# Patient Record
Sex: FEMALE | Race: WHITE | Employment: FULL TIME | URBAN - METROPOLITAN AREA
[De-identification: names, ages, dates, MRNs, and addresses within clinical notes are randomized per-mention and may not be internally consistent; named-entity substitution may affect disease eponyms.]

---

## 2017-03-06 ENCOUNTER — TELEPHONE (OUTPATIENT)
Dept: INTERNAL MEDICINE CLINIC | Age: 57
End: 2017-03-06

## 2017-03-06 DIAGNOSIS — Z20.828 EXPOSURE TO THE FLU: Primary | ICD-10-CM

## 2017-03-06 RX ORDER — OSELTAMIVIR PHOSPHATE 75 MG/1
75 CAPSULE ORAL DAILY
Qty: 10 CAPSULE | Refills: 0 | Status: SHIPPED | OUTPATIENT
Start: 2017-03-06 | End: 2017-03-16

## 2017-11-15 ENCOUNTER — HOSPITAL ENCOUNTER (OUTPATIENT)
Dept: MAMMOGRAPHY | Age: 57
Discharge: HOME | End: 2017-11-15
Attending: INTERNAL MEDICINE | Admitting: INTERNAL MEDICINE

## 2017-11-15 DIAGNOSIS — Z12.31 VISIT FOR SCREENING MAMMOGRAM: ICD-10-CM

## 2019-01-10 ENCOUNTER — OFFICE VISIT (OUTPATIENT)
Dept: INTERNAL MEDICINE CLINIC | Age: 59
End: 2019-01-10
Payer: COMMERCIAL

## 2019-01-10 ENCOUNTER — TELEPHONE (OUTPATIENT)
Dept: INTERNAL MEDICINE CLINIC | Age: 59
End: 2019-01-10

## 2019-01-10 ENCOUNTER — HOSPITAL ENCOUNTER (OUTPATIENT)
Dept: VASCULAR LAB | Age: 59
Discharge: HOME | End: 2019-01-10
Payer: COMMERCIAL

## 2019-01-10 VITALS
SYSTOLIC BLOOD PRESSURE: 160 MMHG | RESPIRATION RATE: 12 BRPM | DIASTOLIC BLOOD PRESSURE: 100 MMHG | WEIGHT: 203 LBS | BODY MASS INDEX: 32.77 KG/M2 | HEART RATE: 110 BPM

## 2019-01-10 DIAGNOSIS — I83.893 VARICOSE VEINS OF BOTH LOWER EXTREMITIES WITH COMPLICATIONS: ICD-10-CM

## 2019-01-10 DIAGNOSIS — I87.303 CHRONIC VENOUS HYPERTENSION INVOLVING BOTH SIDES: ICD-10-CM

## 2019-01-10 DIAGNOSIS — I10 WHITE COAT SYNDROME WITH DIAGNOSIS OF HYPERTENSION: ICD-10-CM

## 2019-01-10 DIAGNOSIS — I51.89 DIASTOLIC DYSFUNCTION: ICD-10-CM

## 2019-01-10 DIAGNOSIS — I80.01 THROMBOPHLEBITIS OF SUPERFICIAL VEINS OF RIGHT LOWER EXTREMITY: Primary | ICD-10-CM

## 2019-01-10 DIAGNOSIS — R00.0 TACHYCARDIA: ICD-10-CM

## 2019-01-10 DIAGNOSIS — I80.01 THROMBOPHLEBITIS OF SUPERFICIAL VEINS OF RIGHT LOWER EXTREMITY: ICD-10-CM

## 2019-01-10 PROBLEM — I82.811 ACUTE SUPERFICIAL VENOUS THROMBOSIS OF RIGHT LOWER EXTREMITY: Status: ACTIVE | Noted: 2019-01-10

## 2019-01-10 PROCEDURE — 93970 EXTREMITY STUDY: CPT

## 2019-01-10 PROCEDURE — 99213 OFFICE O/P EST LOW 20 MIN: CPT | Performed by: INTERNAL MEDICINE

## 2019-01-10 RX ORDER — METOPROLOL SUCCINATE 25 MG/1
25 TABLET, EXTENDED RELEASE ORAL DAILY
Qty: 30 TABLET | Refills: 3 | Status: SHIPPED | OUTPATIENT
Start: 2019-01-10 | End: 2019-05-03 | Stop reason: SDUPTHER

## 2019-01-10 RX ORDER — IBUPROFEN 600 MG/1
600 TABLET ORAL
Qty: 60 TABLET | Refills: 0 | Status: SHIPPED | OUTPATIENT
Start: 2019-01-10 | End: 2019-07-12

## 2019-02-04 ENCOUNTER — OFFICE VISIT (OUTPATIENT)
Dept: INTERNAL MEDICINE CLINIC | Age: 59
End: 2019-02-04
Payer: COMMERCIAL

## 2019-02-04 VITALS
HEIGHT: 66 IN | HEART RATE: 92 BPM | BODY MASS INDEX: 33.11 KG/M2 | DIASTOLIC BLOOD PRESSURE: 90 MMHG | SYSTOLIC BLOOD PRESSURE: 150 MMHG | WEIGHT: 206 LBS | RESPIRATION RATE: 12 BRPM

## 2019-02-04 DIAGNOSIS — Z23 FLU VACCINE NEED: ICD-10-CM

## 2019-02-04 DIAGNOSIS — E78.5 HYPERLIPIDEMIA, UNSPECIFIED HYPERLIPIDEMIA TYPE: ICD-10-CM

## 2019-02-04 DIAGNOSIS — Z00.00 ANNUAL PHYSICAL EXAM: Primary | ICD-10-CM

## 2019-02-04 DIAGNOSIS — Z11.4 SCREENING FOR HIV (HUMAN IMMUNODEFICIENCY VIRUS): ICD-10-CM

## 2019-02-04 DIAGNOSIS — E66.9 OBESITY (BMI 30-39.9): ICD-10-CM

## 2019-02-04 DIAGNOSIS — Z11.59 NEED FOR HEPATITIS C SCREENING TEST: ICD-10-CM

## 2019-02-04 DIAGNOSIS — I83.813 VARICOSE VEINS OF BOTH LOWER EXTREMITIES WITH PAIN: ICD-10-CM

## 2019-02-04 DIAGNOSIS — I10 BENIGN ESSENTIAL HTN: ICD-10-CM

## 2019-02-04 DIAGNOSIS — Z23 NEED FOR SHINGLES VACCINE: ICD-10-CM

## 2019-02-04 DIAGNOSIS — R73.01 IMPAIRED FASTING GLUCOSE: ICD-10-CM

## 2019-02-04 LAB
BACTERIA: ABNORMAL /HPF
BILIRUBIN URINE: NEGATIVE
BLOOD, URINE: NEGATIVE
CLARITY: CLEAR
COLOR: YELLOW
EPITHELIAL CELLS, UA: 5 /HPF (ref 0–5)
GLUCOSE URINE: NEGATIVE MG/DL
HYALINE CASTS: 4 /LPF (ref 0–8)
KETONES, URINE: NEGATIVE MG/DL
LEUKOCYTE ESTERASE, URINE: ABNORMAL
MICROSCOPIC EXAMINATION: YES
NITRITE, URINE: NEGATIVE
PH UA: 5
PROTEIN UA: NEGATIVE MG/DL
RBC UA: 2 /HPF (ref 0–4)
SPECIFIC GRAVITY UA: 1.02
UROBILINOGEN, URINE: 0.2 E.U./DL
WBC UA: 5 /HPF (ref 0–5)

## 2019-02-04 PROCEDURE — 90471 IMMUNIZATION ADMIN: CPT | Performed by: INTERNAL MEDICINE

## 2019-02-04 PROCEDURE — 93000 ELECTROCARDIOGRAM COMPLETE: CPT | Performed by: INTERNAL MEDICINE

## 2019-02-04 PROCEDURE — 99396 PREV VISIT EST AGE 40-64: CPT | Performed by: INTERNAL MEDICINE

## 2019-02-04 PROCEDURE — 90686 IIV4 VACC NO PRSV 0.5 ML IM: CPT | Performed by: INTERNAL MEDICINE

## 2019-02-04 RX ORDER — HYDROCHLOROTHIAZIDE 25 MG/1
25 TABLET ORAL DAILY
Qty: 30 TABLET | Refills: 1 | Status: SHIPPED | OUTPATIENT
Start: 2019-02-04 | End: 2019-04-05 | Stop reason: SDUPTHER

## 2019-02-04 ASSESSMENT — PATIENT HEALTH QUESTIONNAIRE - PHQ9
2. FEELING DOWN, DEPRESSED OR HOPELESS: 0
1. LITTLE INTEREST OR PLEASURE IN DOING THINGS: 0
SUM OF ALL RESPONSES TO PHQ9 QUESTIONS 1 & 2: 0
SUM OF ALL RESPONSES TO PHQ QUESTIONS 1-9: 0
SUM OF ALL RESPONSES TO PHQ QUESTIONS 1-9: 0

## 2019-02-26 ENCOUNTER — OFFICE VISIT (OUTPATIENT)
Dept: INTERNAL MEDICINE CLINIC | Age: 59
End: 2019-02-26
Payer: COMMERCIAL

## 2019-02-26 VITALS
BODY MASS INDEX: 32.44 KG/M2 | SYSTOLIC BLOOD PRESSURE: 124 MMHG | WEIGHT: 201 LBS | OXYGEN SATURATION: 100 % | DIASTOLIC BLOOD PRESSURE: 80 MMHG | HEART RATE: 92 BPM

## 2019-02-26 DIAGNOSIS — I10 BENIGN ESSENTIAL HTN: Primary | ICD-10-CM

## 2019-02-26 DIAGNOSIS — R00.0 RACING HEART BEAT: ICD-10-CM

## 2019-02-26 DIAGNOSIS — I87.2 VENOUS INSUFFICIENCY OF BOTH LOWER EXTREMITIES: ICD-10-CM

## 2019-02-26 PROCEDURE — 99213 OFFICE O/P EST LOW 20 MIN: CPT | Performed by: INTERNAL MEDICINE

## 2019-04-05 DIAGNOSIS — I10 BENIGN ESSENTIAL HTN: ICD-10-CM

## 2019-04-05 RX ORDER — HYDROCHLOROTHIAZIDE 25 MG/1
TABLET ORAL
Qty: 30 TABLET | Refills: 3 | Status: SHIPPED | OUTPATIENT
Start: 2019-04-05 | End: 2019-08-07 | Stop reason: SDUPTHER

## 2019-05-03 DIAGNOSIS — I51.89 DIASTOLIC DYSFUNCTION: ICD-10-CM

## 2019-05-03 DIAGNOSIS — R00.0 TACHYCARDIA: ICD-10-CM

## 2019-05-03 DIAGNOSIS — I10 WHITE COAT SYNDROME WITH DIAGNOSIS OF HYPERTENSION: ICD-10-CM

## 2019-05-03 RX ORDER — METOPROLOL SUCCINATE 25 MG/1
TABLET, EXTENDED RELEASE ORAL
Qty: 30 TABLET | Refills: 2 | Status: SHIPPED | OUTPATIENT
Start: 2019-05-03 | End: 2019-07-22 | Stop reason: DRUGHIGH

## 2019-06-18 ENCOUNTER — HOSPITAL ENCOUNTER (OUTPATIENT)
Dept: MAMMOGRAPHY | Age: 59
Discharge: HOME | End: 2019-06-18
Payer: COMMERCIAL

## 2019-06-18 DIAGNOSIS — Z12.31 VISIT FOR SCREENING MAMMOGRAM: ICD-10-CM

## 2019-06-18 PROCEDURE — 77063 BREAST TOMOSYNTHESIS BI: CPT

## 2019-07-12 ENCOUNTER — OFFICE VISIT (OUTPATIENT)
Dept: SURGERY | Age: 59
End: 2019-07-12
Payer: COMMERCIAL

## 2019-07-12 VITALS
OXYGEN SATURATION: 100 % | HEIGHT: 66 IN | HEART RATE: 115 BPM | DIASTOLIC BLOOD PRESSURE: 93 MMHG | TEMPERATURE: 98.1 F | BODY MASS INDEX: 32.78 KG/M2 | SYSTOLIC BLOOD PRESSURE: 145 MMHG | WEIGHT: 204 LBS

## 2019-07-12 DIAGNOSIS — K80.20 GALLSTONES: Primary | ICD-10-CM

## 2019-07-12 PROCEDURE — 99243 OFF/OP CNSLTJ NEW/EST LOW 30: CPT | Performed by: SURGERY

## 2019-07-12 NOTE — PROGRESS NOTES
extended release tablet TAKE ONE TABLET BY MOUTH DAILY 30 tablet 2    hydrochlorothiazide (HYDRODIURIL) 25 MG tablet TAKE ONE TABLET BY MOUTH DAILY 30 tablet 3    aspirin EC 81 MG EC tablet Take 1 tablet by mouth daily. No current facility-administered medications for this visit. Review of Systems:  Review of all other systems was negative except as noted in HPI.     BP (!) 145/93 Comment: recheck after 5 minutes  Pulse 115   Temp 98.1 °F (36.7 °C) (Oral)   Ht 5' 6\" (1.676 m)   Wt 204 lb (92.5 kg)   SpO2 100%   BMI 32.93 kg/m²         Physical Exam:   Constitutional: She is oriented to person, place, and time. She appears well-developed and well-nourished. No distress. HENT: Moist mucus membranes  Head: Normocephalic and atraumatic. Eyes: EOM are normal. Pupils are equal, round, and no icterus. Neck: Normal range of motion. Neck supple. No thyromegaly present. Cardiovascular: Normal rate and regular rhythm by peripheral pulses. Pulmonary/Chest: No respiratory distress. Abdominal: Soft. She exhibits no distension and no mass. There is no hepatosplenomegaly. Discomfort on palpation in RUQ. Has no CVA tenderness. Extremities: She exhibits no edema. Lymphadenopathy: She has no cervical adenopathy. Neurological: Grossly intact. Skin: Skin is warm and dry. Psychiatric:Normal mood and affect. Her behavior is normal. Judgment and thought content normal.        Assessment:     Symptomatic Cholelithiasis. Plan:      1. I discussed the nature of gallbladder disease with the patient. I discussed the operation of laparoscopic cholecystectomy possible open with intraoperative cholagiogram in detail with pictures and printed information given. The complications related to the operation and the anesthesia including bile duct injury explained in detail.  Patient verbalized understanding of the risks, benefits and alternatives of surgery and all questions were answered to the patient's satisfaction. 2. My office will schedule the surgery and will inform the patient about preoperative instructions.     Brett James MD  Surgical Oncologist / General Surgeon

## 2019-07-17 ENCOUNTER — TELEPHONE (OUTPATIENT)
Dept: SURGERY | Age: 59
End: 2019-07-17

## 2019-07-22 ENCOUNTER — OFFICE VISIT (OUTPATIENT)
Dept: INTERNAL MEDICINE CLINIC | Age: 59
End: 2019-07-22
Payer: COMMERCIAL

## 2019-07-22 VITALS
HEIGHT: 66 IN | WEIGHT: 204 LBS | SYSTOLIC BLOOD PRESSURE: 142 MMHG | HEART RATE: 86 BPM | RESPIRATION RATE: 12 BRPM | DIASTOLIC BLOOD PRESSURE: 90 MMHG | OXYGEN SATURATION: 99 % | BODY MASS INDEX: 32.78 KG/M2

## 2019-07-22 DIAGNOSIS — I10 WHITE COAT SYNDROME WITH DIAGNOSIS OF HYPERTENSION: ICD-10-CM

## 2019-07-22 DIAGNOSIS — I83.813 VARICOSE VEINS OF BOTH LOWER EXTREMITIES WITH PAIN: ICD-10-CM

## 2019-07-22 DIAGNOSIS — R73.01 IMPAIRED FASTING GLUCOSE: ICD-10-CM

## 2019-07-22 DIAGNOSIS — I87.2 VENOUS INSUFFICIENCY OF BOTH LOWER EXTREMITIES: ICD-10-CM

## 2019-07-22 DIAGNOSIS — E78.5 HYPERLIPIDEMIA, UNSPECIFIED HYPERLIPIDEMIA TYPE: ICD-10-CM

## 2019-07-22 DIAGNOSIS — K80.20 CALCULUS OF GALLBLADDER WITHOUT CHOLECYSTITIS WITHOUT OBSTRUCTION: ICD-10-CM

## 2019-07-22 DIAGNOSIS — Z01.818 PREOP EXAM FOR INTERNAL MEDICINE: Primary | ICD-10-CM

## 2019-07-22 PROCEDURE — 93000 ELECTROCARDIOGRAM COMPLETE: CPT | Performed by: INTERNAL MEDICINE

## 2019-07-22 PROCEDURE — 99243 OFF/OP CNSLTJ NEW/EST LOW 30: CPT | Performed by: INTERNAL MEDICINE

## 2019-07-22 RX ORDER — METOPROLOL SUCCINATE 50 MG/1
50 TABLET, EXTENDED RELEASE ORAL DAILY
Qty: 30 TABLET | Refills: 3 | Status: SHIPPED | OUTPATIENT
Start: 2019-07-22 | End: 2019-12-04 | Stop reason: SDUPTHER

## 2019-07-22 NOTE — PROGRESS NOTES
[facility]) Physicians  Internal Medicine  Patient Encounter  Amy Solares D.O., Samira Gonzalez          Chief Complaint   Patient presents with    Pre-op Exam       HPI-- 64 y.o. female presents today for a preoperative physical.  Pt diagnosed with symptomatically lithiasis. Pt states she was having episodes of RUQ discomfort after eating. She may have had 10 bouts of the pain always after eating. This past Saturday, she had a bad episode after eating. These episodes are becoming more frequent. She has had some nausea and clamminess associated with these spells. The pain radiates to the shoulder blade region. Her sister suggested she have her GB checked. She had presented to her gynecologist who performed an ultrasound (was getting a pelvic US at the same time) revealing multiple gallstones. Patient is now scheduled for a robotic laparoscopic cholecystectomy with intraoperative cholangiogram.  I have been asked to see patient for pre-operative risk assessment and clearance. Surgery scheduled for 7/26/2019 by Dr. Ari Jordan. Hypertension--Patient remains on Toprol-XL 25 mill grams daily and hydrochlorthiazide 25 mg daily. Her medication compliance is excellent. She denies any symptoms suggestive of accelerated hypertension. She specifically denies any headaches, dizziness, lightheadedness, swelling, visual disturbances, or episodes of unilateral weakness or paresthesias. She denies any other symptoms related to stroke or TIA. She has a reported history of TIA back in 2011. Carotid atherosclerosis--her last carotid Doppler showed less than 50% narrowing of both internal carotid arteries. She did have greater than 50% stenosis of the right external carotid artery. Patient denies any visual disturbances or any other stroke related symptoms. Impaired fasting glucose--Her last fasting glucose was 107.   She endorses no symptoms suggestive of glucose toxicity such as excessive thirst or frequent

## 2019-07-23 ENCOUNTER — HOSPITAL ENCOUNTER (OUTPATIENT)
Age: 59
Discharge: HOME | End: 2019-07-23
Payer: COMMERCIAL

## 2019-07-23 ENCOUNTER — HOSPITAL ENCOUNTER (OUTPATIENT)
Dept: GENERAL RADIOLOGY | Age: 59
Discharge: HOME | End: 2019-07-23
Payer: COMMERCIAL

## 2019-07-23 DIAGNOSIS — Z01.811 PRE-OP CHEST EXAM: ICD-10-CM

## 2019-07-23 PROCEDURE — 71046 X-RAY EXAM CHEST 2 VIEWS: CPT

## 2019-07-25 ENCOUNTER — ANESTHESIA EVENT (OUTPATIENT)
Dept: OPERATING ROOM | Age: 59
End: 2019-07-25
Payer: COMMERCIAL

## 2019-07-25 NOTE — PROGRESS NOTES
bag with personal items. Please have any large items you may need brought in by your family after your arrival to your hospital room. 15. If you have a Living Will or Durable Power of , please bring a copy on the day of your procedure. 15. With your permission, one family member may accompany you while you are being prepared for surgery. Once you are ready, additional family members may join you. HOW WE KEEP YOU SAFE and WORK TO PREVENT SURGICAL SITE INFECTIONS:  1. Health care workers should always check your ID bracelet to verify your name and birth date. You will be asked many times to state your name, date of birth, and allergies. 2. Health care workers should always clean their hands with soap or alcohol gel before providing care to you. It is okay to ask anyone if they cleaned their hands before they touch you. 3. You will be actively involved in verifying the type of procedure you are having and ensuring the correct surgical site. This will be confirmed multiple times prior to your procedure. Do NOT cele your surgery site UNLESS instructed to by your surgeon. 4. Do not shave or wax for 72 hours prior to procedure near your operative site. Shaving with a razor can irritate your skin and make it easier to develop an infection. On the day of your procedure, any hair that needs to be removed near the surgical site will be clipped by a healthcare worker using a special clippers designed to avoid skin irritation. 5. When you are in the operating room, your surgical site will be cleansed with a special soap, and in most cases, you will be given an antibiotic before the surgery begins. What to expect AFTER YOUR PROCEDURE:  1. Immediately following your procedure, your will be taken to the PACU for the first phase of your recovery.   Your nurse will help you recover from any potential side effects of anesthesia, such as extreme drowsiness, changes in your vital signs or breathing patterns. Nausea, headache, muscle aches, or sore throat may also occur after anesthesia. Your nurse will help you manage these potential side effects. 2. For comfort and safety, arrange to have someone at home with you for the first 24 hours after discharge. 3. You and your family will be given written instructions about your diet, activity, dressing care, medications, and return visits. 4. Once at home, should issues with nausea, pain, or bleeding occur, or should you notice any signs of infection, you should call your surgeon. 5. Always clean your hands before and after caring for your wound. Do not let your family touch your surgery site without cleaning their hands. 6. Narcotic pain medications can cause significant constipation. You may want to add a stool softener to your postoperative medication schedule or speak to your surgeon on how best to manage this SIDE EFFECT. SPECIAL INSTRUCTIONS      Thank you for allowing us to care for you. We strive to exceed your expectations in the delivery of care and service provided to you and your family. If you need to contact us for any reason, please call us at [phone]    Instructions reviewed with patient during preadmission testing phone interview. Silvia Weiner. 7/25/2019 .11:28 AM      ADDITIONAL EDUCATIONAL INFORMATION REVIEWED PER PHONE WITH YOU AND/OR YOUR FAMILY:  No Bring a urine sample on day of surgery  Yes Pain Goal-Taking Control of Your Pain  Yes FAQs about Surgical Site Infections  Yes Hibiclens® Bathing Instructions   Yes Antibacterial Soap  No Toni® Wipes Bathing Instructions (Obtained from: https://www.[employer]/. pdf )  No Incentive Spirometer Education  No Other

## 2019-07-26 ENCOUNTER — HOSPITAL ENCOUNTER (OUTPATIENT)
Age: 59
Setting detail: OUTPATIENT SURGERY
Discharge: HOME | End: 2019-07-26
Attending: SURGERY | Admitting: SURGERY
Payer: COMMERCIAL

## 2019-07-26 ENCOUNTER — ANESTHESIA (OUTPATIENT)
Dept: OPERATING ROOM | Age: 59
End: 2019-07-26
Payer: COMMERCIAL

## 2019-07-26 VITALS
SYSTOLIC BLOOD PRESSURE: 104 MMHG | HEIGHT: 66 IN | OXYGEN SATURATION: 96 % | WEIGHT: 204 LBS | TEMPERATURE: 97.3 F | BODY MASS INDEX: 32.78 KG/M2 | RESPIRATION RATE: 14 BRPM | DIASTOLIC BLOOD PRESSURE: 69 MMHG | HEART RATE: 74 BPM

## 2019-07-26 VITALS — DIASTOLIC BLOOD PRESSURE: 105 MMHG | SYSTOLIC BLOOD PRESSURE: 172 MMHG | OXYGEN SATURATION: 93 %

## 2019-07-26 DIAGNOSIS — K80.20 SYMPTOMATIC CHOLELITHIASIS: Primary | ICD-10-CM

## 2019-07-26 PROCEDURE — 2500000003 HC RX 250 WO HCPCS: Performed by: NURSE ANESTHETIST, CERTIFIED REGISTERED

## 2019-07-26 PROCEDURE — 3600000019 HC SURGERY ROBOT ADDTL 15MIN: Performed by: SURGERY

## 2019-07-26 PROCEDURE — 7100000010 HC PHASE II RECOVERY - FIRST 15 MIN: Performed by: SURGERY

## 2019-07-26 PROCEDURE — 2580000003 HC RX 258: Performed by: ANESTHESIOLOGY

## 2019-07-26 PROCEDURE — 47563 LAPARO CHOLECYSTECTOMY/GRAPH: CPT | Performed by: SURGERY

## 2019-07-26 PROCEDURE — 2720000010 HC SURG SUPPLY STERILE: Performed by: SURGERY

## 2019-07-26 PROCEDURE — S2900 ROBOTIC SURGICAL SYSTEM: HCPCS | Performed by: SURGERY

## 2019-07-26 PROCEDURE — 2500000003 HC RX 250 WO HCPCS: Performed by: SURGERY

## 2019-07-26 PROCEDURE — 6360000002 HC RX W HCPCS: Performed by: ANESTHESIOLOGY

## 2019-07-26 PROCEDURE — 6360000002 HC RX W HCPCS: Performed by: SURGERY

## 2019-07-26 PROCEDURE — 6370000000 HC RX 637 (ALT 250 FOR IP)

## 2019-07-26 PROCEDURE — 2580000003 HC RX 258: Performed by: SURGERY

## 2019-07-26 PROCEDURE — 3600000009 HC SURGERY ROBOT BASE: Performed by: SURGERY

## 2019-07-26 PROCEDURE — 3700000000 HC ANESTHESIA ATTENDED CARE: Performed by: SURGERY

## 2019-07-26 PROCEDURE — 7100000011 HC PHASE II RECOVERY - ADDTL 15 MIN: Performed by: SURGERY

## 2019-07-26 PROCEDURE — 6360000002 HC RX W HCPCS: Performed by: NURSE ANESTHETIST, CERTIFIED REGISTERED

## 2019-07-26 PROCEDURE — 7100000001 HC PACU RECOVERY - ADDTL 15 MIN: Performed by: SURGERY

## 2019-07-26 PROCEDURE — 3700000001 HC ADD 15 MINUTES (ANESTHESIA): Performed by: SURGERY

## 2019-07-26 PROCEDURE — 2709999900 HC NON-CHARGEABLE SUPPLY: Performed by: SURGERY

## 2019-07-26 PROCEDURE — 7100000000 HC PACU RECOVERY - FIRST 15 MIN: Performed by: SURGERY

## 2019-07-26 PROCEDURE — 88304 TISSUE EXAM BY PATHOLOGIST: CPT

## 2019-07-26 RX ORDER — NEOSTIGMINE METHYLSULFATE 5 MG/5 ML
SYRINGE (ML) INTRAVENOUS PRN
Status: DISCONTINUED | OUTPATIENT
Start: 2019-07-26 | End: 2019-07-26 | Stop reason: SDUPTHER

## 2019-07-26 RX ORDER — SODIUM CHLORIDE, SODIUM LACTATE, POTASSIUM CHLORIDE, CALCIUM CHLORIDE 600; 310; 30; 20 MG/100ML; MG/100ML; MG/100ML; MG/100ML
INJECTION, SOLUTION INTRAVENOUS CONTINUOUS
Status: DISCONTINUED | OUTPATIENT
Start: 2019-07-26 | End: 2019-07-26 | Stop reason: HOSPADM

## 2019-07-26 RX ORDER — INDOCYANINE GREEN AND WATER 25 MG
KIT INJECTION PRN
Status: DISCONTINUED | OUTPATIENT
Start: 2019-07-26 | End: 2019-07-26 | Stop reason: SDUPTHER

## 2019-07-26 RX ORDER — DEXAMETHASONE SODIUM PHOSPHATE 4 MG/ML
INJECTION, SOLUTION INTRA-ARTICULAR; INTRALESIONAL; INTRAMUSCULAR; INTRAVENOUS; SOFT TISSUE PRN
Status: DISCONTINUED | OUTPATIENT
Start: 2019-07-26 | End: 2019-07-26 | Stop reason: SDUPTHER

## 2019-07-26 RX ORDER — PROPOFOL 10 MG/ML
INJECTION, EMULSION INTRAVENOUS PRN
Status: DISCONTINUED | OUTPATIENT
Start: 2019-07-26 | End: 2019-07-26 | Stop reason: SDUPTHER

## 2019-07-26 RX ORDER — LIDOCAINE HYDROCHLORIDE 20 MG/ML
INJECTION, SOLUTION INTRAVENOUS PRN
Status: DISCONTINUED | OUTPATIENT
Start: 2019-07-26 | End: 2019-07-26 | Stop reason: SDUPTHER

## 2019-07-26 RX ORDER — MAGNESIUM HYDROXIDE 1200 MG/15ML
LIQUID ORAL CONTINUOUS PRN
Status: COMPLETED | OUTPATIENT
Start: 2019-07-26 | End: 2019-07-26

## 2019-07-26 RX ORDER — PROMETHAZINE HYDROCHLORIDE 25 MG/ML
6.25 INJECTION, SOLUTION INTRAMUSCULAR; INTRAVENOUS
Status: COMPLETED | OUTPATIENT
Start: 2019-07-26 | End: 2019-07-26

## 2019-07-26 RX ORDER — FENTANYL CITRATE 50 UG/ML
INJECTION, SOLUTION INTRAMUSCULAR; INTRAVENOUS PRN
Status: DISCONTINUED | OUTPATIENT
Start: 2019-07-26 | End: 2019-07-26 | Stop reason: SDUPTHER

## 2019-07-26 RX ORDER — SODIUM CHLORIDE 0.9 % (FLUSH) 0.9 %
10 SYRINGE (ML) INJECTION PRN
Status: DISCONTINUED | OUTPATIENT
Start: 2019-07-26 | End: 2019-07-26 | Stop reason: HOSPADM

## 2019-07-26 RX ORDER — SODIUM CHLORIDE 0.9 % (FLUSH) 0.9 %
10 SYRINGE (ML) INJECTION EVERY 12 HOURS SCHEDULED
Status: DISCONTINUED | OUTPATIENT
Start: 2019-07-26 | End: 2019-07-26 | Stop reason: HOSPADM

## 2019-07-26 RX ORDER — SODIUM CHLORIDE, SODIUM LACTATE, POTASSIUM CHLORIDE, AND CALCIUM CHLORIDE .6; .31; .03; .02 G/100ML; G/100ML; G/100ML; G/100ML
IRRIGANT IRRIGATION PRN
Status: DISCONTINUED | OUTPATIENT
Start: 2019-07-26 | End: 2019-07-26 | Stop reason: ALTCHOICE

## 2019-07-26 RX ORDER — ONDANSETRON 2 MG/ML
INJECTION INTRAMUSCULAR; INTRAVENOUS PRN
Status: DISCONTINUED | OUTPATIENT
Start: 2019-07-26 | End: 2019-07-26 | Stop reason: SDUPTHER

## 2019-07-26 RX ORDER — ONDANSETRON 2 MG/ML
4 INJECTION INTRAMUSCULAR; INTRAVENOUS
Status: DISCONTINUED | OUTPATIENT
Start: 2019-07-26 | End: 2019-07-26 | Stop reason: HOSPADM

## 2019-07-26 RX ORDER — INDOCYANINE GREEN AND WATER 25 MG
KIT INJECTION PRN
Status: DISCONTINUED | OUTPATIENT
Start: 2019-07-26 | End: 2019-07-26 | Stop reason: ALTCHOICE

## 2019-07-26 RX ORDER — LIDOCAINE HYDROCHLORIDE 10 MG/ML
1 INJECTION, SOLUTION EPIDURAL; INFILTRATION; INTRACAUDAL; PERINEURAL
Status: DISCONTINUED | OUTPATIENT
Start: 2019-07-26 | End: 2019-07-26 | Stop reason: HOSPADM

## 2019-07-26 RX ORDER — BUPIVACAINE HYDROCHLORIDE AND EPINEPHRINE 5; 5 MG/ML; UG/ML
INJECTION, SOLUTION EPIDURAL; INTRACAUDAL; PERINEURAL PRN
Status: DISCONTINUED | OUTPATIENT
Start: 2019-07-26 | End: 2019-07-26 | Stop reason: ALTCHOICE

## 2019-07-26 RX ORDER — ROCURONIUM BROMIDE 10 MG/ML
INJECTION, SOLUTION INTRAVENOUS PRN
Status: DISCONTINUED | OUTPATIENT
Start: 2019-07-26 | End: 2019-07-26 | Stop reason: SDUPTHER

## 2019-07-26 RX ORDER — FENTANYL CITRATE 50 UG/ML
25 INJECTION, SOLUTION INTRAMUSCULAR; INTRAVENOUS EVERY 5 MIN PRN
Status: DISCONTINUED | OUTPATIENT
Start: 2019-07-26 | End: 2019-07-26 | Stop reason: HOSPADM

## 2019-07-26 RX ORDER — GLYCOPYRROLATE 1 MG/5 ML
SYRINGE (ML) INTRAVENOUS PRN
Status: DISCONTINUED | OUTPATIENT
Start: 2019-07-26 | End: 2019-07-26 | Stop reason: SDUPTHER

## 2019-07-26 RX ORDER — SUCCINYLCHOLINE/SOD CL,ISO/PF 200MG/10ML
SYRINGE (ML) INTRAVENOUS PRN
Status: DISCONTINUED | OUTPATIENT
Start: 2019-07-26 | End: 2019-07-26 | Stop reason: SDUPTHER

## 2019-07-26 RX ORDER — OXYCODONE HYDROCHLORIDE AND ACETAMINOPHEN 5; 325 MG/1; MG/1
1 TABLET ORAL EVERY 6 HOURS PRN
Qty: 20 TABLET | Refills: 0 | Status: SHIPPED | OUTPATIENT
Start: 2019-07-26 | End: 2019-07-31

## 2019-07-26 RX ORDER — FENTANYL CITRATE 50 UG/ML
50 INJECTION, SOLUTION INTRAMUSCULAR; INTRAVENOUS EVERY 5 MIN PRN
Status: DISCONTINUED | OUTPATIENT
Start: 2019-07-26 | End: 2019-07-26 | Stop reason: HOSPADM

## 2019-07-26 RX ADMIN — FENTANYL CITRATE 50 MCG: 50 INJECTION INTRAMUSCULAR; INTRAVENOUS at 09:51

## 2019-07-26 RX ADMIN — DEXAMETHASONE SODIUM PHOSPHATE 4 MG: 4 INJECTION, SOLUTION INTRAMUSCULAR; INTRAVENOUS at 08:53

## 2019-07-26 RX ADMIN — FENTANYL CITRATE 50 MCG: 50 INJECTION INTRAMUSCULAR; INTRAVENOUS at 09:48

## 2019-07-26 RX ADMIN — ROCURONIUM BROMIDE 35 MG: 10 INJECTION, SOLUTION INTRAVENOUS at 08:16

## 2019-07-26 RX ADMIN — ROCURONIUM BROMIDE 10 MG: 10 INJECTION, SOLUTION INTRAVENOUS at 08:47

## 2019-07-26 RX ADMIN — Medication 0.8 MG: at 09:44

## 2019-07-26 RX ADMIN — PROPOFOL 200 MG: 10 INJECTION, EMULSION INTRAVENOUS at 08:10

## 2019-07-26 RX ADMIN — PROMETHAZINE HYDROCHLORIDE 6.25 MG: 25 INJECTION INTRAMUSCULAR; INTRAVENOUS at 10:37

## 2019-07-26 RX ADMIN — SODIUM CHLORIDE, SODIUM LACTATE, POTASSIUM CHLORIDE, AND CALCIUM CHLORIDE: 600; 310; 30; 20 INJECTION, SOLUTION INTRAVENOUS at 09:49

## 2019-07-26 RX ADMIN — HYDROMORPHONE HYDROCHLORIDE 0.5 MG: 1 INJECTION, SOLUTION INTRAMUSCULAR; INTRAVENOUS; SUBCUTANEOUS at 10:25

## 2019-07-26 RX ADMIN — FENTANYL CITRATE 100 MCG: 50 INJECTION INTRAMUSCULAR; INTRAVENOUS at 08:10

## 2019-07-26 RX ADMIN — Medication 2 G: at 08:20

## 2019-07-26 RX ADMIN — HYDROMORPHONE HYDROCHLORIDE 0.5 MG: 1 INJECTION, SOLUTION INTRAMUSCULAR; INTRAVENOUS; SUBCUTANEOUS at 10:13

## 2019-07-26 RX ADMIN — ROCURONIUM BROMIDE 5 MG: 10 INJECTION, SOLUTION INTRAVENOUS at 08:10

## 2019-07-26 RX ADMIN — HYDROMORPHONE HYDROCHLORIDE 0.5 MG: 1 INJECTION, SOLUTION INTRAMUSCULAR; INTRAVENOUS; SUBCUTANEOUS at 11:46

## 2019-07-26 RX ADMIN — Medication 4 MG: at 09:44

## 2019-07-26 RX ADMIN — INDOCYANINE GREEN AND WATER 7.5 MG: KIT at 08:21

## 2019-07-26 RX ADMIN — SODIUM CHLORIDE, SODIUM LACTATE, POTASSIUM CHLORIDE, AND CALCIUM CHLORIDE: 600; 310; 30; 20 INJECTION, SOLUTION INTRAVENOUS at 08:04

## 2019-07-26 RX ADMIN — SODIUM CHLORIDE, SODIUM LACTATE, POTASSIUM CHLORIDE, AND CALCIUM CHLORIDE: 600; 310; 30; 20 INJECTION, SOLUTION INTRAVENOUS at 07:25

## 2019-07-26 RX ADMIN — LIDOCAINE HYDROCHLORIDE 50 MG: 20 INJECTION, SOLUTION INTRAVENOUS at 08:10

## 2019-07-26 RX ADMIN — Medication 100 MG: at 08:10

## 2019-07-26 RX ADMIN — SODIUM CHLORIDE, SODIUM LACTATE, POTASSIUM CHLORIDE, AND CALCIUM CHLORIDE: 600; 310; 30; 20 INJECTION, SOLUTION INTRAVENOUS at 07:24

## 2019-07-26 RX ADMIN — FENTANYL CITRATE 100 MCG: 50 INJECTION INTRAMUSCULAR; INTRAVENOUS at 08:44

## 2019-07-26 RX ADMIN — ONDANSETRON 4 MG: 2 INJECTION INTRAMUSCULAR; INTRAVENOUS at 09:33

## 2019-07-26 ASSESSMENT — PULMONARY FUNCTION TESTS
PIF_VALUE: 19
PIF_VALUE: 1
PIF_VALUE: 27
PIF_VALUE: 0
PIF_VALUE: 5
PIF_VALUE: 25
PIF_VALUE: 26
PIF_VALUE: 29
PIF_VALUE: 26
PIF_VALUE: 21
PIF_VALUE: 20
PIF_VALUE: 27
PIF_VALUE: 26
PIF_VALUE: 25
PIF_VALUE: 26
PIF_VALUE: 1
PIF_VALUE: 27
PIF_VALUE: 19
PIF_VALUE: 26
PIF_VALUE: 19
PIF_VALUE: 25
PIF_VALUE: 8
PIF_VALUE: 1
PIF_VALUE: 0
PIF_VALUE: 20
PIF_VALUE: 26
PIF_VALUE: 20
PIF_VALUE: 1
PIF_VALUE: 26
PIF_VALUE: 25
PIF_VALUE: 18
PIF_VALUE: 1
PIF_VALUE: 26
PIF_VALUE: 21
PIF_VALUE: 27
PIF_VALUE: 22
PIF_VALUE: 20
PIF_VALUE: 25
PIF_VALUE: 26
PIF_VALUE: 2
PIF_VALUE: 26
PIF_VALUE: 20
PIF_VALUE: 19
PIF_VALUE: 25
PIF_VALUE: 22
PIF_VALUE: 27
PIF_VALUE: 26
PIF_VALUE: 26
PIF_VALUE: 25
PIF_VALUE: 20
PIF_VALUE: 26
PIF_VALUE: 26
PIF_VALUE: 24
PIF_VALUE: 28
PIF_VALUE: 19
PIF_VALUE: 26
PIF_VALUE: 22
PIF_VALUE: 26
PIF_VALUE: 19
PIF_VALUE: 25
PIF_VALUE: 20
PIF_VALUE: 26
PIF_VALUE: 27
PIF_VALUE: 4
PIF_VALUE: 20
PIF_VALUE: 22
PIF_VALUE: 20
PIF_VALUE: 18
PIF_VALUE: 24
PIF_VALUE: 25
PIF_VALUE: 20
PIF_VALUE: 18
PIF_VALUE: 27
PIF_VALUE: 26
PIF_VALUE: 26
PIF_VALUE: 1
PIF_VALUE: 20
PIF_VALUE: 28
PIF_VALUE: 25
PIF_VALUE: 26
PIF_VALUE: 3
PIF_VALUE: 20
PIF_VALUE: 26
PIF_VALUE: 25
PIF_VALUE: 5
PIF_VALUE: 26
PIF_VALUE: 27
PIF_VALUE: 26
PIF_VALUE: 20
PIF_VALUE: 17
PIF_VALUE: 21
PIF_VALUE: 22
PIF_VALUE: 26
PIF_VALUE: 20
PIF_VALUE: 27
PIF_VALUE: 20
PIF_VALUE: 0
PIF_VALUE: 28
PIF_VALUE: 19
PIF_VALUE: 28
PIF_VALUE: 27
PIF_VALUE: 19
PIF_VALUE: 28
PIF_VALUE: 20
PIF_VALUE: 26
PIF_VALUE: 26
PIF_VALUE: 28

## 2019-07-26 ASSESSMENT — PAIN SCALES - GENERAL
PAINLEVEL_OUTOF10: 9
PAINLEVEL_OUTOF10: 7
PAINLEVEL_OUTOF10: 4
PAINLEVEL_OUTOF10: 9
PAINLEVEL_OUTOF10: 8

## 2019-07-26 ASSESSMENT — PAIN DESCRIPTION - LOCATION
LOCATION: ABDOMEN

## 2019-07-26 ASSESSMENT — PAIN DESCRIPTION - PAIN TYPE
TYPE: SURGICAL PAIN

## 2019-07-26 ASSESSMENT — LIFESTYLE VARIABLES: SMOKING_STATUS: 1

## 2019-07-26 ASSESSMENT — PAIN DESCRIPTION - FREQUENCY
FREQUENCY: CONTINUOUS

## 2019-07-26 ASSESSMENT — PAIN - FUNCTIONAL ASSESSMENT: PAIN_FUNCTIONAL_ASSESSMENT: 0-10

## 2019-07-26 NOTE — PROGRESS NOTES
Ambulatory Surgery/Procedure Discharge Note    Vitals:    07/26/19 1403   BP:    Pulse:    Resp:    Temp: 97.3 °F (36.3 °C)   SpO2:        In: 1443 [P.O.:75; I.V.:1368]  Out: 30     Restroom use offered before discharge. Yes    Pain assessment:  present - adequately treated  Pain Level: 4        Patient discharged to home/self care.  Patient discharged via wheel chair by transporter to waiting family/S.O.       7/26/2019 2:15 PM

## 2019-07-26 NOTE — PROGRESS NOTES
CLINICAL PHARMACY NOTE: MEDS TO [address] Select Patient?: No  Total # of Prescriptions Filled: 1   The following medications were delivered to the patient:  · Oxycodone-acetaminophen 5/325mg  Total # of Interventions Completed: 0  Time Spent (min): 60    Additional Documentation:

## 2019-07-26 NOTE — PROGRESS NOTES
PACU Transfer to [facility]    Vitals:    07/26/19 1220   BP:    Pulse: 66   Resp: 12   Temp:    SpO2: 99%         Intake/Output Summary (Last 24 hours) at 7/26/2019 1221  Last data filed at 7/26/2019 1215  Gross per 24 hour   Intake 1368 ml   Output 30 ml   Net 1338 ml       Pain assessment:  Above BP is 95/58 and temperature is 97.3 and pain tolerable at a level 4. Patient meets all patricia discharge criteria for Phase 2.   Pain Level: 4    Patient transferred to care of [facility] RN.    7/26/2019 12:21 PM

## 2019-07-26 NOTE — OP NOTE
Operative note    Ni Arreguin  1960  [id number]    Pre-operative Diagnosis: symptomatic cholelithiasis    Post-operative Diagnosis: same    Procedure: Robotic assisted laparoscopic cholecystectomy, ICG cholangiogram imaging      Indication for procedure: This is a 64 y.o woman who has been having postprandial pain for two months. She had imaging that showed cholelithiasis which was consistent with symptomatic cholelithiasis. Robotic / laparoscopic gall bladder removal was recommended and the patient was agreeable to the aforementioned procedure. All the complications including bile duct injury were explained. Risks, benefits and alternatives of surgery explained to the patient and patient wishes to proceed with surgery. Description of procedure: The patient was taken to the operating theatre and laid in supine position on the operating table. Bilateral lower athrombics were placed. General endotracheal anesthesia was provided per Anesthesiology. Preoperative antibiotics were given and a timeout was called. Once anesthetized, the patient's abdomen was prepped and draped in the usual sterile fashion. We first made a transverse incision and placed a 5 mm trocar via [facility]. The abdomen was insufflated. Three more 8-mm working trocars were placed -- one in the right lateral position just below the level of the umbilicus; one at the level of the umbilicus 8 cm lateral (right); and one just above the umbilicus. We upsized the LUQ port to 8 mm. We then docked the robot in standard fashion. The gallbladder was grasped and retracted cephalad to the right shoulder, over the right lobe of the liver. There were moderate adhesions to the gallbladder fundus, which were taken down with a combination of blunt dissection and electrocautery. The fundus was grasped and retracted laterally. The peritoneum along the medial and lateral sides of the gallbladder was scored.  The peritoneal attachments were taken down in a circumferential manner. The cystic duct and cystic artery were dissected, creating a critical view. Cystic duct and bile duct anatomy was also clarified by ICG firefly imaging (ICG was given at starting of the procedure). Bipolar and a hem-o-lock clip were used to ligate the cystic artery. Heme-o-lock clips were placed to the cystic duct (one on the distal end of the duct and two more proximal on the cystic duct. The cystic duct was ligated with laparoscopic scissors. The gallbladder was removed from the gallbladder fossa with electrocautery. The gallbladder was entered during the removal and there was some bile spillage but no stones. We then undocked the robot and finished the case laparoscopically. The specimen was handed into an EndoCatch bag and passed off the surgical field. Inspection revealed that hemostasis was assured. All clips were in their normal positions and secure. We irrigated the gallbladder fossa and above the liver/perihepatic space copiously with saline. The supraumbilical port was closed with suture passer. The abdomen was desufflated. We irrigated the supraumbilical port site with saline irrigation. The  port sites were reapproximated with 4-0 monocryl in interrupted fashion. The wounds were dried and skin glue was applied. All sponge, instrument, and needle counts were correct x2 at the end of the case. The patient was tolerated the procedure well and was taken to the PACU in stable condition.     Dr. Avtar Parker scrubbed and present for the entire case. Anesthesia: General, Local 30 mL 0.5% marcaine    Surgeons/Assistants: Davida Mackey / Jeannie Anderson MD PGY5    Estimated Blood Loss: <5 mL    Complications: none    Specimens: gallbladder    Findings: non-inflamed gallbladder    Dispo: PACU, then home    Jeannie Anderson MD  07/26/19  9:55 AM    I was present for the entire procedure. Agree with the above note and changes made accordingly.      Nitesh

## 2019-07-26 NOTE — PROGRESS NOTES
Pt and spouse verbalize understanding of dc instructions. Spouse had scripts filled at our outpt pharmacy. Pt incisions are clean dry and intact.

## 2019-08-02 ENCOUNTER — OFFICE VISIT (OUTPATIENT)
Dept: SURGERY | Age: 59
End: 2019-08-02

## 2019-08-02 VITALS
SYSTOLIC BLOOD PRESSURE: 130 MMHG | WEIGHT: 202 LBS | DIASTOLIC BLOOD PRESSURE: 92 MMHG | HEIGHT: 66 IN | TEMPERATURE: 97 F | BODY MASS INDEX: 32.47 KG/M2 | OXYGEN SATURATION: 100 % | HEART RATE: 90 BPM

## 2019-08-02 DIAGNOSIS — Z09 POSTOP CHECK: Primary | ICD-10-CM

## 2019-08-02 PROCEDURE — 99024 POSTOP FOLLOW-UP VISIT: CPT | Performed by: SURGERY

## 2019-08-07 DIAGNOSIS — I10 BENIGN ESSENTIAL HTN: ICD-10-CM

## 2019-08-07 RX ORDER — HYDROCHLOROTHIAZIDE 25 MG/1
TABLET ORAL
Qty: 30 TABLET | Refills: 5 | Status: SHIPPED | OUTPATIENT
Start: 2019-08-07 | End: 2020-01-31

## 2019-08-12 ENCOUNTER — TELEPHONE (OUTPATIENT)
Dept: VASCULAR SURGERY | Age: 59
End: 2019-08-12

## 2019-08-12 DIAGNOSIS — I87.2 VENOUS INSUFFICIENCY OF BOTH LOWER EXTREMITIES: Primary | ICD-10-CM

## 2019-08-16 ENCOUNTER — HOSPITAL ENCOUNTER (OUTPATIENT)
Dept: VASCULAR LAB | Age: 59
Discharge: HOME | End: 2019-08-16
Payer: COMMERCIAL

## 2019-08-16 PROCEDURE — 93970 EXTREMITY STUDY: CPT

## 2019-08-20 ENCOUNTER — OFFICE VISIT (OUTPATIENT)
Dept: VASCULAR SURGERY | Age: 59
End: 2019-08-20
Payer: COMMERCIAL

## 2019-08-20 VITALS
DIASTOLIC BLOOD PRESSURE: 100 MMHG | HEIGHT: 66 IN | RESPIRATION RATE: 16 BRPM | WEIGHT: 204.8 LBS | SYSTOLIC BLOOD PRESSURE: 142 MMHG | OXYGEN SATURATION: 98 % | TEMPERATURE: 97.5 F | HEART RATE: 93 BPM | BODY MASS INDEX: 32.92 KG/M2

## 2019-08-20 DIAGNOSIS — I83.11 VARICOSE VEINS OF RIGHT LOWER EXTREMITY WITH INFLAMMATION: ICD-10-CM

## 2019-08-20 PROCEDURE — 99214 OFFICE O/P EST MOD 30 MIN: CPT | Performed by: SURGERY

## 2019-08-20 NOTE — PROGRESS NOTES
Rupert Goodwin MD at [address] [facility] SURGERY Right 2012    removed scar tissue, stone was gone when they went in to get it.  RETINAL LASER  2015   [address]    x 2 on left     SKIN BIOPSY  7/2015    x2     Home Medications:   Prior to Admission medications    Medication Sig Start Date End Date Taking? Authorizing Provider   hydrochlorothiazide (HYDRODIURIL) 25 MG tablet TAKE ONE TABLET BY MOUTH DAILY 19  Yes Pavel Thompson, DO   metoprolol succinate (TOPROL XL) 50 MG extended release tablet Take 1 tablet by mouth daily 19  Yes Pavel Thompson DO   aspirin EC 81 MG EC tablet Take 1 tablet by mouth daily. 11  Yes Leigh Ann Engle MD      Allergies:  Biaxin [clarithromycin]     Social History:    Social History     Socioeconomic History    Marital status:      Spouse name: Not on file    Number of children: Not on file    Years of education: Not on file    Highest education level: Not on file   Occupational History    Not on file   Social Needs    Financial resource strain: Not on file    Food insecurity:     Worry: Not on file     Inability: Not on file    Transportation needs:     Medical: Not on file     Non-medical: Not on file   Tobacco Use    Smoking status: Former Smoker     Packs/day: 0.50     Years: 35.00     Pack years: 17.50     Types: Cigarettes     Last attempt to quit: 2019     Years since quittin.5    Smokeless tobacco: Former User     Quit date: 2016   Substance and Sexual Activity    Alcohol use:  Yes     Alcohol/week: 0.0 standard drinks     Comment: weekends    Drug use: No    Sexual activity: Yes     Partners: Male   Lifestyle    Physical activity:     Days per week: Not on file     Minutes per session: Not on file    Stress: Not on file   Relationships    Social connections:     Talks on phone: Not on file     Gets together: Not on file     Attends [employer] service: Not

## 2019-09-11 ENCOUNTER — OFFICE VISIT (OUTPATIENT)
Dept: INTERNAL MEDICINE CLINIC | Age: 59
End: 2019-09-11
Payer: COMMERCIAL

## 2019-09-11 VITALS
SYSTOLIC BLOOD PRESSURE: 136 MMHG | RESPIRATION RATE: 12 BRPM | BODY MASS INDEX: 33.57 KG/M2 | WEIGHT: 208 LBS | DIASTOLIC BLOOD PRESSURE: 80 MMHG | HEART RATE: 72 BPM

## 2019-09-11 DIAGNOSIS — M17.0 BILATERAL PRIMARY OSTEOARTHRITIS OF KNEE: Primary | ICD-10-CM

## 2019-09-11 DIAGNOSIS — I10 BENIGN ESSENTIAL HTN: ICD-10-CM

## 2019-09-11 PROCEDURE — 99213 OFFICE O/P EST LOW 20 MIN: CPT | Performed by: INTERNAL MEDICINE

## 2019-09-11 RX ORDER — MELOXICAM 15 MG/1
15 TABLET ORAL
Qty: 30 TABLET | Refills: 0 | Status: SHIPPED | OUTPATIENT
Start: 2019-09-11 | End: 2020-04-06 | Stop reason: ALTCHOICE

## 2019-09-11 NOTE — PATIENT INSTRUCTIONS
making the arthritis worse. · Do not sit for long periods of time. Try to walk once in a while to keep your knee from getting stiff. · Ask your doctor or physical therapist whether shoe inserts may reduce your arthritis pain. · If you can afford it, get new athletic shoes at least every year. This can help reduce the strain on your knees. · Use a device to help you do everyday activities. ? A cane or walking stick can help you keep your balance when you walk. Hold the cane or walking stick in the hand opposite the painful knee. ? If you feel like you may fall when you walk, try using crutches or a front-wheeled walker. These can prevent falls that could cause more damage to your knee. ? A knee brace may help keep your knee stable and prevent pain. ? You also can use other things to make life easier, such as a higher toilet seat and handrails in the bathtub or shower. · Take pain medicines exactly as directed. ? Do not wait until you are in severe pain. You will get better results if you take it sooner. ? If you are not taking a prescription pain medicine, take an over-the-counter medicine such as acetaminophen (Tylenol), ibuprofen (Advil, Motrin), or naproxen (Aleve). Read and follow all instructions on the label. ? Do not take two or more pain medicines at the same time unless the doctor told you to. Many pain medicines have acetaminophen, which is Tylenol. Too much acetaminophen (Tylenol) can be harmful. ? Tell your doctor if you take a blood thinner, have diabetes, or have allergies to shellfish. · Ask your doctor if you might benefit from a shot of steroid medicine into your knee. This may provide pain relief for several months. · Many people take the supplements glucosamine and chondroitin for osteoarthritis. Some people feel they help, but the medical research does not show that they work. Talk to your doctor before you take these supplements. When should you call for help?   Call your doctor stretch. 3. Hold for about 15 to 30 seconds, then relax your leg against the towel or belt strap. 4. Repeat 2 to 4 times. 5. Switch legs and repeat steps 1 through 4, even if only one knee is sore. Stationary exercise bike    1. If you do not have a stationary exercise bike at home, you can find one to ride at your local health club or community center. 2. Adjust the height of the bike seat so that your knee is slightly bent when your leg is extended downward. If your knee hurts when the pedal reaches the top, you can raise the seat so that your knee does not bend as much. 3. Start slowly. At first, try to do 5 to 10 minutes of cycling with little to no resistance. Then increase your time and the resistance bit by bit until you can do 20 to 30 minutes without pain. 4. If you start to have pain, rest your knee until your pain gets back to the level that is normal for you. Or cycle for less time or with less effort. Follow-up care is a key part of your treatment and safety. Be sure to make and go to all appointments, and call your doctor if you are having problems. It's also a good idea to know your test results and keep a list of the medicines you take. Where can you learn more? Go to https://First Retail.Mindframe. org and sign in to your Confide account. Enter C159 in the [facility] box to learn more about \"Knee Arthritis: Exercises. \"     If you do not have an account, please click on the \"Sign Up Now\" link. Current as of: September 20, 2018  Content Version: 12.1  © 6294-4870 Healthwise, Incorporated. Care instructions adapted under license by [facility] ([facility]). If you have questions about a medical condition or this instruction, always ask your healthcare professional. [place] [address] any warranty or liability for your use of this information. Patient Education        Home Blood Pressure Test: About This Test  What is it?     A home blood pressure test allows blood pressure monitors  · Place the earpieces of a stethoscope in your ears, and place the bell of the stethoscope over the artery, just below the cuff. · Close the valve on the rubber inflating bulb. · Squeeze the bulb rapidly with your opposite hand to inflate the cuff until the dial or column of mercury reads about 30 mm Hg higher than your usual systolic pressure. If you do not know your usual pressure, inflate the cuff to 210 mm Hg or until the pulse at your wrist disappears. · Open the pressure valve just slightly by twisting or pressing the valve on the bulb. · As you watch the pressure slowly fall, note the level on the dial at which you first start to hear a pulsing or tapping sound through the stethoscope. This is your systolic blood pressure. · Continue letting the air out slowly. The sounds will become muffled and will finally disappear. Note the pressure when the sounds completely disappear. This is your diastolic blood pressure. Let out all the remaining air. · Write your numbers in your log book, along with the date and time. What else should you know about the test?  It is more accurate to take the average of several readings made throughout the day than to rely on a single reading. It's normal for blood pressure to go up and down throughout the day. Follow-up care is a key part of your treatment and safety. Be sure to make and go to all appointments, and call your doctor if you are having problems. It's also a good idea to keep a list of the medicines you take. Where can you learn more? Go to https://UniversityLyfeannie.[employer] Technologies. org and sign in to your Embrane account. Enter C427 in the [employer] box to learn more about \"Home Blood Pressure Test: About This Test.\"     If you do not have an account, please click on the \"Sign Up Now\" link. Current as of: July 22, 2018  Content Version: 12.1  © 1912-9956 Healthwise, Incorporated.  Care instructions adapted under license by [facility] ([place] [facility]). If you have questions about a medical condition or this instruction, always ask your healthcare professional. [address] any warranty or liability for your use of this information. Patient Education        Learning About High Blood Pressure  What is high blood pressure? Blood pressure is a measure of how hard the blood pushes against the walls of your arteries. It's normal for blood pressure to go up and down throughout the day, but if it stays up, you have high blood pressure. Another name for high blood pressure is hypertension. Two numbers tell you your blood pressure. The first number is the systolic pressure. It shows how hard the blood pushes when your heart is pumping. The second number is the diastolic pressure. It shows how hard the blood pushes between heartbeats, when your heart is relaxed and filling with blood. Your doctor will give you a goal for your blood pressure. Your goal will be based on your health and your age. High blood pressure (hypertension) means that the top number stays high, or the bottom number stays high, or both. High blood pressure increases the risk of stroke, heart attack, and other problems. You and your doctor will talk about your risks of these problems based on your blood pressure. What happens when you have high blood pressure? · Blood flows through your arteries with too much force. Over time, this damages the walls of your arteries. But you can't feel it. High blood pressure usually doesn't cause symptoms. · Fat and calcium start to build up in your arteries. This buildup is called plaque. Plaque makes your arteries narrower and stiffer. Blood can't flow through them as easily. · This lack of good blood flow starts to damage some of the organs in your body. This can lead to problems such as coronary artery disease and heart attack, heart failure, stroke, kidney failure, and eye damage.   How can you prevent high blood have an account, please click on the \"Sign Up Now\" link. Current as of: July 22, 2018  Content Version: 12.1  © 8902-4248 [employer]. Care instructions adapted under license by [facility] ([facility]). If you have questions about a medical condition or this instruction, always ask your healthcare professional. [address] any warranty or liability for your use of this information. Patient Education        DASH Diet: Care Instructions  Your Care Instructions    The DASH diet is an eating plan that can help lower your blood pressure. DASH stands for Dietary Approaches to Stop Hypertension. Hypertension is high blood pressure. The DASH diet focuses on eating foods that are high in calcium, potassium, and magnesium. These nutrients can lower blood pressure. The foods that are highest in these nutrients are fruits, vegetables, low-fat dairy products, nuts, seeds, and legumes. But taking calcium, potassium, and magnesium supplements instead of eating foods that are high in those nutrients does not have the same effect. The DASH diet also includes whole grains, fish, and poultry. The DASH diet is one of several lifestyle changes your doctor may recommend to lower your high blood pressure. Your doctor may also want you to decrease the amount of sodium in your diet. Lowering sodium while following the DASH diet can lower blood pressure even further than just the DASH diet alone. Follow-up care is a key part of your treatment and safety. Be sure to make and go to all appointments, and call your doctor if you are having problems. It's also a good idea to know your test results and keep a list of the medicines you take. How can you care for yourself at home? Following the DASH diet  · Eat 4 to 5 servings of fruit each day. A serving is 1 medium-sized piece of fruit, ½ cup chopped or canned fruit, 1/4 cup dried fruit, or 4 ounces (½ cup) of fruit juice.  Choose fruit more often than fruit juice.  · Eat 4 to 5 servings of vegetables each day. A serving is 1 cup of lettuce or raw leafy vegetables, ½ cup of chopped or cooked vegetables, or 4 ounces (½ cup) of vegetable juice. Choose vegetables more often than vegetable juice. · Get 2 to 3 servings of low-fat and fat-free dairy each day. A serving is 8 ounces of milk, 1 cup of yogurt, or 1 ½ ounces of cheese. · Eat 6 to 8 servings of grains each day. A serving is 1 slice of bread, 1 ounce of dry cereal, or ½ cup of cooked rice, pasta, or cooked cereal. Try to choose whole-grain products as much as possible. · Limit lean meat, poultry, and fish to 2 servings each day. A serving is 3 ounces, about the size of a deck of cards. · Eat 4 to 5 servings of nuts, seeds, and legumes (cooked dried beans, lentils, and split peas) each week. A serving is 1/3 cup of nuts, 2 tablespoons of seeds, or ½ cup of cooked beans or peas. · Limit fats and oils to 2 to 3 servings each day. A serving is 1 teaspoon of vegetable oil or 2 tablespoons of salad dressing. · Limit sweets and added sugars to 5 servings or less a week. A serving is 1 tablespoon jelly or jam, ½ cup sorbet, or 1 cup of lemonade. · Eat less than 2,300 milligrams (mg) of sodium a day. If you limit your sodium to 1,500 mg a day, you can lower your blood pressure even more. Tips for success  · Start small. Do not try to make dramatic changes to your diet all at once. You might feel that you are missing out on your favorite foods and then be more likely to not follow the plan. Make small changes, and stick with them. Once those changes become habit, add a few more changes. · Try some of the following:  ? Make it a goal to eat a fruit or vegetable at every meal and at snacks. This will make it easy to get the recommended amount of fruits and vegetables each day. ? Try yogurt topped with fruit and nuts for a snack or healthy dessert. ? Add lettuce, tomato, cucumber, and onion to sandwiches.   ? Combine a

## 2019-10-09 ENCOUNTER — TELEPHONE (OUTPATIENT)
Dept: SURGERY | Age: 59
End: 2019-10-09

## 2019-12-04 DIAGNOSIS — I10 WHITE COAT SYNDROME WITH DIAGNOSIS OF HYPERTENSION: ICD-10-CM

## 2019-12-05 RX ORDER — METOPROLOL SUCCINATE 50 MG/1
TABLET, EXTENDED RELEASE ORAL
Qty: 30 TABLET | Refills: 5 | Status: SHIPPED | OUTPATIENT
Start: 2019-12-05 | End: 2020-04-06 | Stop reason: SDUPTHER

## 2020-01-09 ENCOUNTER — E-VISIT (OUTPATIENT)
Dept: FAMILY MEDICINE CLINIC | Age: 60
End: 2020-01-09
Payer: COMMERCIAL

## 2020-01-09 PROCEDURE — 98970 NQHP OL DIG ASSMT&MGMT 5-10: CPT | Performed by: NURSE PRACTITIONER

## 2020-01-09 RX ORDER — AMOXICILLIN AND CLAVULANATE POTASSIUM 875; 125 MG/1; MG/1
1 TABLET, FILM COATED ORAL 2 TIMES DAILY
Qty: 20 TABLET | Refills: 0 | Status: SHIPPED | OUTPATIENT
Start: 2020-01-09 | End: 2020-01-19

## 2020-01-09 ASSESSMENT — LIFESTYLE VARIABLES
PACKS_PER_DAY: 10
SMOKING_YEARS: 30
SMOKING_STATUS: NO, I'M A FORMER SMOKER

## 2020-01-31 RX ORDER — HYDROCHLOROTHIAZIDE 25 MG/1
TABLET ORAL
Qty: 30 TABLET | Refills: 0 | Status: SHIPPED | OUTPATIENT
Start: 2020-01-31 | End: 2020-03-04

## 2020-03-04 RX ORDER — HYDROCHLOROTHIAZIDE 25 MG/1
TABLET ORAL
Qty: 30 TABLET | Refills: 0 | Status: SHIPPED | OUTPATIENT
Start: 2020-03-04 | End: 2020-04-01

## 2020-03-17 ENCOUNTER — E-VISIT (OUTPATIENT)
Dept: INTERNAL MEDICINE CLINIC | Age: 60
End: 2020-03-17
Payer: COMMERCIAL

## 2020-03-17 PROCEDURE — 99441 PR PHYS/QHP TELEPHONE EVALUATION 5-10 MIN: CPT | Performed by: INTERNAL MEDICINE

## 2020-03-17 RX ORDER — SULFACETAMIDE SODIUM 100 MG/ML
2 SOLUTION/ DROPS OPHTHALMIC 4 TIMES DAILY
Qty: 1 BOTTLE | Refills: 0 | Status: SHIPPED | OUTPATIENT
Start: 2020-03-17 | End: 2020-03-27

## 2020-04-01 RX ORDER — HYDROCHLOROTHIAZIDE 25 MG/1
TABLET ORAL
Qty: 30 TABLET | Refills: 0 | Status: SHIPPED | OUTPATIENT
Start: 2020-04-01 | End: 2020-05-07

## 2020-04-06 ENCOUNTER — TELEMEDICINE (OUTPATIENT)
Dept: INTERNAL MEDICINE CLINIC | Age: 60
End: 2020-04-06
Payer: COMMERCIAL

## 2020-04-06 VITALS
HEART RATE: 92 BPM | SYSTOLIC BLOOD PRESSURE: 124 MMHG | DIASTOLIC BLOOD PRESSURE: 92 MMHG | WEIGHT: 206 LBS | RESPIRATION RATE: 12 BRPM | BODY MASS INDEX: 33.25 KG/M2

## 2020-04-06 PROBLEM — J30.2 SEASONAL ALLERGIES: Status: ACTIVE | Noted: 2020-04-06

## 2020-04-06 PROBLEM — I10 WHITE COAT SYNDROME WITH DIAGNOSIS OF HYPERTENSION: Status: ACTIVE | Noted: 2020-04-06

## 2020-04-06 PROCEDURE — 99214 OFFICE O/P EST MOD 30 MIN: CPT | Performed by: INTERNAL MEDICINE

## 2020-04-06 RX ORDER — METOPROLOL SUCCINATE 50 MG/1
75 TABLET, EXTENDED RELEASE ORAL DAILY
Qty: 45 TABLET | Refills: 5 | Status: SHIPPED | OUTPATIENT
Start: 2020-04-06 | End: 2020-06-04 | Stop reason: DRUGHIGH

## 2020-04-06 RX ORDER — CETIRIZINE HYDROCHLORIDE 10 MG/1
10 TABLET ORAL NIGHTLY
Qty: 30 TABLET | Refills: 0 | COMMUNITY
Start: 2020-04-06 | End: 2020-05-06

## 2020-04-06 ASSESSMENT — ENCOUNTER SYMPTOMS
DIARRHEA: 0
VOMITING: 0
COUGH: 0
RHINORRHEA: 1
TROUBLE SWALLOWING: 0
ABDOMINAL PAIN: 0
SHORTNESS OF BREATH: 0
NAUSEA: 0

## 2020-04-06 NOTE — PROGRESS NOTES
having side pain. Her gyn checked the [address]. Her surgeon was Dr. Toy Henao. She denies any increased swelling or leg pain. She does try to watch her salt. She is not wearing compression stockings. Also, she has additional complaints of knee pain. She had an MRI left knee. She needs a knee replacement. She got three injections of viscosupplementation. Dr. Neda Chavez. She states her knee feels great now. Also, she has additional complaints of allergies. She is a longstanding history of seasonal allergies. She will occasionally use Coricidin HBP. She gets HA's, nasal congestion. She also reports runny nose and sneezing. Same symptoms this time of year. Past Medical History:   Diagnosis Date    Acute superficial venous thrombosis of right lower extremity 01/10/2019    Asymptomatic bilateral carotid artery stenosis 6/5/2016    Colon polyp 11/6471    Diastolic dysfunction     Grade 1, Echo 6/2016    GERD (gastroesophageal reflux disease)     Hemorrhoid 9/24/2013    Hx of blood clots     Hypertension     Impaired fasting glucose 02/06/2019    Nephrolithiasis 3/2012    Osteoarthritis of left knee     Dr. Kayli Pringle    Retinal tear of left eye     Seasonal allergic rhinitis 5/5/2015    TIA (transient ischemic attack) 8/5/2011       Prior to Visit Medications    Medication Sig Taking? Authorizing Provider   hydroCHLOROthiazide (HYDRODIURIL) 25 MG tablet TAKE ONE TABLET BY MOUTH DAILY  Pavel Thompson, DO   metoprolol succinate (TOPROL XL) 50 MG extended release tablet TAKE ONE TABLET BY MOUTH DAILY  Pavel Thompson DO   meloxicam (MOBIC) 15 MG tablet Take 1 tablet by mouth daily (with breakfast)  Pavel Thompson, DO   aspirin EC 81 MG EC tablet Take 1 tablet by mouth daily. Eva Jeffers MD         Review of Systems   Constitutional: Negative for chills, fever and unexpected weight change. HENT: Positive for rhinorrhea and sneezing.  Negative for hearing loss, nosebleeds and trouble American: No      Diabetic: No      Tobacco smoker: No      Systolic Blood Pressure: 765 mmHg      Is BP treated: Yes      HDL Cholesterol: 80 mg/dL      Total Cholesterol: 262 mg/dL     No follow-ups on file. An  electronic signature was used to authenticate this note. --Saeid Corona,  on 4/6/2020 at 2:51 PM      Pursuant to the emergency declaration under the [address] [place] [address] waiver authority and the [employer] and [employer] Act, this Virtual  Visit was conducted, with patient's consent, to reduce the patient's risk of exposure to COVID-19 and provide continuity of care for an established patient. Services were provided through a video synchronous discussion virtually to substitute for in-person clinic visit.

## 2020-04-06 NOTE — PATIENT INSTRUCTIONS
that you are missing out on your favorite foods and then be more likely to not follow the plan. Make small changes, and stick with them. Once those changes become habit, add a few more changes. · Try some of the following:  ? Make it a goal to eat a fruit or vegetable at every meal and at snacks. This will make it easy to get the recommended amount of fruits and vegetables each day. ? Try yogurt topped with fruit and nuts for a snack or healthy dessert. ? Add lettuce, tomato, cucumber, and onion to sandwiches. ? Combine a ready-made pizza crust with low-fat mozzarella cheese and lots of vegetable toppings. Try using tomatoes, squash, spinach, broccoli, carrots, cauliflower, and onions. ? Have a variety of cut-up vegetables with a low-fat dip as an appetizer instead of chips and dip. ? Sprinkle sunflower seeds or chopped almonds over salads. Or try adding chopped walnuts or almonds to cooked vegetables. ? Try some vegetarian meals using beans and peas. Add garbanzo or kidney beans to salads. Make burritos and tacos with mashed chiang beans or black beans. Where can you learn more? Go to https://Maxcyte.ReFashioner. org and sign in to your [employer] account. Enter A679 in the [facility] box to learn more about \"DASH Diet: Care Instructions. \"     If you do not have an account, please click on the \"Sign Up Now\" link. Current as of: December 15, 2019Content Version: 12.4  © 1829-8347 Healthwise, Incorporated. Care instructions adapted under license by [facility] ([facility]). If you have questions about a medical condition or this instruction, always ask your healthcare professional. [address] any warranty or liability for your use of this information. Patient Education        Learning About Low-Carbohydrate Diets for Weight Loss  What is a low-carbohydrate diet? Low-carb diets avoid foods that are high in carbohydrate.  These high-carb foods include pasta, bread, rice, cereal, fruits, and starchy vegetables. Instead, these diets usually have you eat foods that are high in fat and protein. Many people lose weight quickly on a low-carb diet. But the early weight loss is water. People on this diet often gain the weight back after they start eating carbs again. Not all diet plans are safe or work well. A lot of the evidence shows that low-carb diets aren't healthy. That's because these diets often don't include healthy foods like fruits and vegetables. Losing weight safely means balancing protein, fat, and carbs with every meal and snack. And low-carb diets don't always provide the vitamins, minerals, and fiber you need. If you have a serious medical condition, talk to your doctor before you try any diet. These conditions include kidney disease, heart disease, type 2 diabetes, high cholesterol, and high blood pressure. If you are pregnant, it may not be safe for your baby if you are on a low-carb diet. How can you lose weight safely? You might have heard that a diet plan helped another person lose weight. But that doesn't mean that it will work for you. It is very hard to stay on a diet that includes lots of big changes in your eating habits. If you want to get to a healthy weight and stay there, making healthy lifestyle changes will often work better than dieting. These steps can help. · Make a plan for change. Work with your doctor to create a plan that is right for you. · See a dietitian. He or she can show you how to make healthy changes in your eating habits. · Manage stress. If you have a lot of stress in your life, it can be hard to focus on making healthy changes to your daily habits. · Track your food and activity. You are likely to do better at losing weight if you keep track of what you eat and what you do. Follow-up care is a key part of your treatment and safety. Be sure to make and go to all appointments, and call your doctor if you are having problems.  It's

## 2020-05-07 RX ORDER — HYDROCHLOROTHIAZIDE 25 MG/1
TABLET ORAL
Qty: 30 TABLET | Refills: 0 | Status: SHIPPED | OUTPATIENT
Start: 2020-05-07 | End: 2020-06-04 | Stop reason: ALTCHOICE

## 2020-05-14 ENCOUNTER — APPOINTMENT (OUTPATIENT)
Dept: GENERAL RADIOLOGY | Age: 60
End: 2020-05-14
Payer: COMMERCIAL

## 2020-05-14 ENCOUNTER — HOSPITAL ENCOUNTER (EMERGENCY)
Age: 60
Discharge: HOME | End: 2020-05-15
Attending: EMERGENCY MEDICINE
Payer: COMMERCIAL

## 2020-05-14 VITALS
WEIGHT: 157.5 LBS | TEMPERATURE: 98.1 F | BODY MASS INDEX: 26.24 KG/M2 | OXYGEN SATURATION: 98 % | SYSTOLIC BLOOD PRESSURE: 144 MMHG | RESPIRATION RATE: 16 BRPM | HEIGHT: 65 IN | DIASTOLIC BLOOD PRESSURE: 103 MMHG | HEART RATE: 88 BPM

## 2020-05-14 LAB
ANION GAP SERPL CALCULATED.3IONS-SCNC: 18 MMOL/L (ref 3–16)
APTT: 26.9 SEC (ref 24.2–36.2)
BASOPHILS ABSOLUTE: 0.1 K/UL (ref 0–0.2)
BASOPHILS RELATIVE PERCENT: 1 %
BUN BLDV-MCNC: 13 MG/DL (ref 7–20)
CALCIUM SERPL-MCNC: 9.4 MG/DL (ref 8.3–10.6)
CHLORIDE BLD-SCNC: 96 MMOL/L (ref 99–110)
CO2: 22 MMOL/L (ref 21–32)
CREAT SERPL-MCNC: 0.8 MG/DL (ref 0.6–1.1)
EOSINOPHILS ABSOLUTE: 0.1 K/UL (ref 0–0.6)
EOSINOPHILS RELATIVE PERCENT: 1.2 %
GFR AFRICAN AMERICAN: >60
GFR NON-AFRICAN AMERICAN: >60
GLUCOSE BLD-MCNC: 114 MG/DL (ref 70–99)
HCT VFR BLD CALC: 39.6 % (ref 36–48)
HEMOGLOBIN: 13.6 G/DL (ref 12–16)
INR BLD: 1 (ref 0.86–1.14)
LYMPHOCYTES ABSOLUTE: 2.7 K/UL (ref 1–5.1)
LYMPHOCYTES RELATIVE PERCENT: 29.9 %
MAGNESIUM: 1.9 MG/DL (ref 1.8–2.4)
MCH RBC QN AUTO: 31.7 PG (ref 26–34)
MCHC RBC AUTO-ENTMCNC: 34.2 G/DL (ref 31–36)
MCV RBC AUTO: 92.7 FL (ref 80–100)
MONOCYTES ABSOLUTE: 0.7 K/UL (ref 0–1.3)
MONOCYTES RELATIVE PERCENT: 8.1 %
NEUTROPHILS ABSOLUTE: 5.5 K/UL (ref 1.7–7.7)
NEUTROPHILS RELATIVE PERCENT: 59.8 %
PDW BLD-RTO: 14 % (ref 12.4–15.4)
PLATELET # BLD: 241 K/UL (ref 135–450)
PMV BLD AUTO: 8.5 FL (ref 5–10.5)
POTASSIUM SERPL-SCNC: 2.7 MMOL/L (ref 3.5–5.1)
PROTHROMBIN TIME: 11.6 SEC (ref 10–13.2)
RBC # BLD: 4.28 M/UL (ref 4–5.2)
SODIUM BLD-SCNC: 136 MMOL/L (ref 136–145)
TROPONIN: <0.01 NG/ML
WBC # BLD: 9.2 K/UL (ref 4–11)

## 2020-05-14 PROCEDURE — 93005 ELECTROCARDIOGRAM TRACING: CPT | Performed by: PHYSICIAN ASSISTANT

## 2020-05-14 PROCEDURE — 96365 THER/PROPH/DIAG IV INF INIT: CPT

## 2020-05-14 PROCEDURE — 80048 BASIC METABOLIC PNL TOTAL CA: CPT

## 2020-05-14 PROCEDURE — 96375 TX/PRO/DX INJ NEW DRUG ADDON: CPT

## 2020-05-14 PROCEDURE — 84484 ASSAY OF TROPONIN QUANT: CPT

## 2020-05-14 PROCEDURE — 85730 THROMBOPLASTIN TIME PARTIAL: CPT

## 2020-05-14 PROCEDURE — 84439 ASSAY OF FREE THYROXINE: CPT

## 2020-05-14 PROCEDURE — 36415 COLL VENOUS BLD VENIPUNCTURE: CPT

## 2020-05-14 PROCEDURE — 83735 ASSAY OF MAGNESIUM: CPT

## 2020-05-14 PROCEDURE — 2500000003 HC RX 250 WO HCPCS

## 2020-05-14 PROCEDURE — 99285 EMERGENCY DEPT VISIT HI MDM: CPT

## 2020-05-14 PROCEDURE — 71046 X-RAY EXAM CHEST 2 VIEWS: CPT

## 2020-05-14 PROCEDURE — 85025 COMPLETE CBC W/AUTO DIFF WBC: CPT

## 2020-05-14 PROCEDURE — 6360000002 HC RX W HCPCS: Performed by: PHYSICIAN ASSISTANT

## 2020-05-14 PROCEDURE — 6370000000 HC RX 637 (ALT 250 FOR IP): Performed by: PHYSICIAN ASSISTANT

## 2020-05-14 PROCEDURE — 84443 ASSAY THYROID STIM HORMONE: CPT

## 2020-05-14 PROCEDURE — 96366 THER/PROPH/DIAG IV INF ADDON: CPT

## 2020-05-14 PROCEDURE — 85610 PROTHROMBIN TIME: CPT

## 2020-05-14 RX ORDER — SODIUM CHLORIDE AND POTASSIUM CHLORIDE .9; .15 G/100ML; G/100ML
1000 SOLUTION INTRAVENOUS ONCE
Status: COMPLETED | OUTPATIENT
Start: 2020-05-14 | End: 2020-05-14

## 2020-05-14 RX ORDER — POTASSIUM CHLORIDE 20 MEQ/1
40 TABLET, EXTENDED RELEASE ORAL ONCE
Status: COMPLETED | OUTPATIENT
Start: 2020-05-14 | End: 2020-05-14

## 2020-05-14 RX ORDER — LANOLIN ALCOHOL/MO/W.PET/CERES
400 CREAM (GRAM) TOPICAL ONCE
Status: COMPLETED | OUTPATIENT
Start: 2020-05-14 | End: 2020-05-14

## 2020-05-14 RX ORDER — METOPROLOL TARTRATE 5 MG/5ML
INJECTION INTRAVENOUS
Status: COMPLETED
Start: 2020-05-14 | End: 2020-05-14

## 2020-05-14 RX ORDER — METOPROLOL TARTRATE 5 MG/5ML
5 INJECTION INTRAVENOUS ONCE
Status: COMPLETED | OUTPATIENT
Start: 2020-05-14 | End: 2020-05-14

## 2020-05-14 RX ORDER — POTASSIUM CHLORIDE 20 MEQ/1
20 TABLET, EXTENDED RELEASE ORAL DAILY
Qty: 30 TABLET | Refills: 0 | Status: SHIPPED | OUTPATIENT
Start: 2020-05-14 | End: 2020-06-23 | Stop reason: ALTCHOICE

## 2020-05-14 RX ADMIN — METOPROLOL TARTRATE 5 MG: 5 INJECTION INTRAVENOUS at 20:13

## 2020-05-14 RX ADMIN — POTASSIUM CHLORIDE 40 MEQ: 20 TABLET, EXTENDED RELEASE ORAL at 21:16

## 2020-05-14 RX ADMIN — POTASSIUM CHLORIDE AND SODIUM CHLORIDE 1000 ML: 900; 150 INJECTION, SOLUTION INTRAVENOUS at 21:17

## 2020-05-14 RX ADMIN — Medication 400 MG: at 21:47

## 2020-05-14 ASSESSMENT — ENCOUNTER SYMPTOMS
SORE THROAT: 0
COUGH: 0
NAUSEA: 0
RHINORRHEA: 0
VOMITING: 0
SHORTNESS OF BREATH: 0
ABDOMINAL PAIN: 0

## 2020-05-15 LAB
EKG ATRIAL RATE: 156 BPM
EKG ATRIAL RATE: 93 BPM
EKG DIAGNOSIS: NORMAL
EKG DIAGNOSIS: NORMAL
EKG P AXIS: 57 DEGREES
EKG P-R INTERVAL: 178 MS
EKG Q-T INTERVAL: 306 MS
EKG Q-T INTERVAL: 400 MS
EKG QRS DURATION: 84 MS
EKG QRS DURATION: 96 MS
EKG QTC CALCULATION (BAZETT): 497 MS
EKG QTC CALCULATION (BAZETT): 500 MS
EKG R AXIS: -16 DEGREES
EKG R AXIS: 27 DEGREES
EKG T AXIS: -54 DEGREES
EKG T AXIS: 23 DEGREES
EKG VENTRICULAR RATE: 161 BPM
EKG VENTRICULAR RATE: 93 BPM
T4 FREE: 1.7 NG/DL (ref 0.9–1.8)
TSH SERPL DL<=0.05 MIU/L-ACNC: 2.18 UIU/ML (ref 0.27–4.2)

## 2020-05-15 NOTE — ED TRIAGE NOTES
Pt arrived to ED with dizziness and chest \"fluttering\" since Saturday. -180. No history of a. Fib. Denies cough/sob.

## 2020-05-15 NOTE — ED PROVIDER NOTES
Course     Nursing Notes, Past Medical Hx,Past Surgical Hx, Social Hx, Allergies, and Family Hx were reviewed. The patient was given the following medications:  Orders Placed This Encounter   Medications    metoprolol (LOPRESSOR) injection 5 mg    metoprolol (LOPRESSOR) 5 MG/5ML injection     JULIEN JACOBO: cabinet override    potassium chloride (KLOR-CON M) extended release tablet 40 mEq    0.9% NaCl with KCl 20 mEq infusion    magnesium oxide (MAG-OX) tablet 400 mg    potassium chloride (KLOR-CON M) 20 MEQ extended release tablet     Sig: Take 1 tablet by mouth daily     Dispense:  30 tablet     Refill:  0       CONSULTS:  IP CONSULT TO [facility] / ASSESSMENT / Jeanine Emil is a 64 y.o. female with a history of hypertension presenting to the ED with complaints of heart fluttering and lightheadedness with an EKG consistent with atrial fibrillation with RVR. IV access was established. She was given IV Lopressor 5 mg after which she converted to normal sinus rhythm. Vital signs remained stable throughout the rest of her stay and she reported resolution of her symptoms. Labs included unremarkable CBC, normal troponin. Renal panel resulted a potassium of 2.7, likely related to her diuretic. It was otherwise unremarkable. Magnesium is normal at 1.9. Coags are unremarkable. X-ray was unremarkable. The patient was given 40 mEq potassium orally and 20 mEq with 1 L of normal saline IV. I spoke with Yoselin post for cardiology who felt that the patient could follow-up as an outpatient and does not need to be started on anticoagulation at this time given the brevity of her episodes. She is started on potassium 20 mEq a day and will follow-up with her primary care provider regarding her hypokalemia for recheck and medication adjustment if needed. This patient was also evaluated by the attending physician.  All care plans were discussed and agreed upon.    Clinical Impression     1. Paroxysmal atrial fibrillation with rapid ventricular response (HCC)    2. Hypokalemia        Disposition     PATIENT REFERRED TO:  Juan Rodrigues, [address] [id number] [facility]    Schedule an appointment as soon as possible for a visit   regarding low potassium, this will need to be rechecked and you will likely need additional prescriptions if you continue the hydrochlorothiazide. Chrissie Chase MD  [address]  [address] [facility] Brian Lees    Schedule an appointment as soon as possible for a visit   for additional evaluation and management of irregular heartbeat      DISCHARGE MEDICATIONS:  New Prescriptions    POTASSIUM CHLORIDE (KLOR-CON M) 20 MEQ EXTENDED RELEASE TABLET    Take 1 tablet by mouth daily       DISPOSITION  Discharged     Urban Kan  05/14/20 [id number]

## 2020-05-15 NOTE — ED PROVIDER NOTES
ED Attending Attestation Note     Date of evaluation: 5/14/2020    This patient was seen by the SARKIS. I have seen and examined the patient, agree with the workup, evaluation, management and diagnosis. The care plan has been discussed. I have reviewed the ECG and concur with the resident's interpretation. I was present for any procedures performed in the SARKIS's note and have made edits to the note where appropriate. My assessment reveals 64 y.o. female with history of obesity, tobacco abuse, anxiety, GERD, hypertension presenting for fast and irregular heart rates. Patient found to be in alternating atrial fibrillation and likely atrial flutter with 2:1 block. Intermittent sinus beats on the monitor as well. Hemodynamically stable, given a single dose of IV metoprolol with conversion to sinus rhythm. She feels significantly improved following this. Labs notable for hypokalemia, most likely due to her thiazide diuretic. Potassium and magnesium supplemented and prescription for potassium provided. Discussed with cardiology who will arrange close outpatient follow-up. Discharged in stable condition with written and verbal instructions for which to return to the ED.        Dina Louie MD  05/14/20 [id number]

## 2020-05-18 ENCOUNTER — VIRTUAL VISIT (OUTPATIENT)
Dept: INTERNAL MEDICINE CLINIC | Age: 60
End: 2020-05-18
Payer: COMMERCIAL

## 2020-05-18 VITALS — SYSTOLIC BLOOD PRESSURE: 130 MMHG | DIASTOLIC BLOOD PRESSURE: 89 MMHG | HEART RATE: 88 BPM | TEMPERATURE: 98 F

## 2020-05-18 PROCEDURE — 99214 OFFICE O/P EST MOD 30 MIN: CPT | Performed by: INTERNAL MEDICINE

## 2020-05-18 ASSESSMENT — ENCOUNTER SYMPTOMS
ABDOMINAL PAIN: 0
NAUSEA: 0
DIARRHEA: 0
COUGH: 0
VOMITING: 0
SHORTNESS OF BREATH: 0

## 2020-05-18 NOTE — PROGRESS NOTES
2020    [facility]) Physicians  Internal Medicine  Patient Encounter  Brooke Khan D.O., [address] -- Audio/Visual (During [facility] public health emergency)      I discussed at this telephone/video visit is a nontraditional type of visit that we are conducting in lieu of an office visit to minimize patient risk during the coronavirus pandemic. I discussed with the patient that I would not be able to perform a full physical examination, but that in most other respects the visit would be beneficial to his/her continued medical care. He/She again gave verbal consent for us to conduct this type of visit. Chief Complaint   Patient presents with    Follow-Up from [address] Atrial Fibrillation    Palpitations    Hypertension         HPI:    Devi Lozada (:  1960) has requested an audio/video evaluation for the following concern(s): ER follow-up    64 y.o. female being evaluated via virtual video visit due to the coronavirus pandemic emergency and public health crisis and inability to see the patient face-to-face in the office. Patient is being evaluated status post ER visit on 2020. Patient presented to the ER feeling faint, lightheaded along with fluttering and heart racing. Patient states that she felt flushed as well. The patient was noted to be in atrial fibrillation and atrial flutter with heart rates ranging from . Lab work obtained in the emergency department showed a potassium of 2.7 and a magnesium level of 1.9. The patient was given IV Lopressor along with IV potassium. A repeat potassium was not obtained. According to the ER report a cardiologist was consulted and it was thought that the patient could return home without anticoagulation. It was thought that the hypokalemia was due to the hydrochlorothiazide. It should be noted that the patient did not obtain her lab work ordered on 2020.   Patient was fearful of going to a lab during the pandemic. It should also be noted that her potassium in the past has been normal even on the hydrochlorothiazide. It was recommended she follow-up with a cardiologist.  Patient contacted their office and she was told they did not have an opening until August!  The patient has requested she see Dr. Ford Villa. At this time, the patient states she feels great. She denies any headaches, lightheadedness, chest pain, shortness of breath. She does still occasionally have palpitations but denies any heart racing or fluttering sensations. Her home blood pressure readings are 120s-130s over 88-90. Past Medical History:   Diagnosis Date    Acute superficial venous thrombosis of right lower extremity 01/10/2019    Asymptomatic bilateral carotid artery stenosis 6/5/2016    Colon polyp 02/7937    Diastolic dysfunction     Grade 1, Echo 6/2016    GERD (gastroesophageal reflux disease)     Hemorrhoid 9/24/2013    HLD (hyperlipidemia)     Hx of blood clots     Hypertension     Impaired fasting glucose 02/06/2019    Nephrolithiasis 3/2012    Osteoarthritis of left knee     Dr. Damian Lu    Retinal tear of left eye     Seasonal allergic rhinitis 5/5/2015    TIA (transient ischemic attack) 8/5/2011    Venous insufficiency        Prior to Visit Medications    Medication Sig Taking? Authorizing Provider   potassium chloride (KLOR-CON M) 20 MEQ extended release tablet Take 1 tablet by mouth daily  TAMAR Galloway   hydroCHLOROthiazide (HYDRODIURIL) 25 MG tablet TAKE ONE TABLET BY MOUTH DAILY  Pavel Thompson,    metoprolol succinate (TOPROL XL) 50 MG extended release tablet Take 1.5 tablets by mouth daily  Pavel Thompson, DO   aspirin EC 81 MG EC tablet Take 1 tablet by mouth daily. Srini Cam MD         Review of Systems   Constitutional: Negative for chills, fatigue, fever and unexpected weight change. HENT: Negative for hearing loss and nosebleeds.     Eyes: Negative for visual disturbance. Respiratory: Negative for cough and shortness of breath. Cardiovascular: Positive for palpitations. Negative for chest pain and leg swelling. No claudication  No Orthopnea     Gastrointestinal: Negative for abdominal pain, diarrhea, nausea and vomiting. Genitourinary: Negative for difficulty urinating. Neurological: Negative for dizziness, syncope, light-headedness and headaches. No unilateral weakness or paresthesias           PHYSICAL EXAMINATION:  [ INSTRUCTIONS:  \"[x]\" Indicates a positive item  \"[]\" Indicates a negative item  -- DELETE ALL ITEMS NOT EXAMINED]  Vital Signs: (As obtained by patient/caregiver or practitioner observation)    Vitals:    05/18/20 1047   BP: 130/89   Pulse: 88   Temp: 98 °F (36.7 °C)     There is no height or weight on file to calculate BMI. Wt Readings from Last 3 Encounters:   05/14/20 157 lb 8 oz (71.4 kg)   04/06/20 206 lb (93.4 kg)   09/11/19 208 lb (94.3 kg)     BP Readings from Last 3 Encounters:   05/18/20 130/89   05/14/20 (!) 144/103   04/06/20 (!) 124/92           Physical Exam  Constitutional:       General: She is not in acute distress. Appearance: Normal appearance. She is not ill-appearing. HENT:      Head: Normocephalic and atraumatic. Mouth/Throat:      Mouth: Mucous membranes are moist.   Eyes:      Extraocular Movements: Extraocular movements intact. Conjunctiva/sclera: Conjunctivae normal.   Neck:      Musculoskeletal: Normal range of motion. Pulmonary:      Effort: Pulmonary effort is normal. No respiratory distress. Skin:     Findings: No rash. Neurological:      Mental Status: She is alert and oriented to person, place, and time. Psychiatric:         Thought Content:  Thought content normal.         Judgment: Judgment normal.                 Due to this being a TeleHealth encounter, evaluation of the following organ systems is limited:

## 2020-05-21 ENCOUNTER — TELEPHONE (OUTPATIENT)
Dept: CARDIOLOGY CLINIC | Age: 60
End: 2020-05-21

## 2020-05-21 NOTE — TELEPHONE ENCOUNTER
Received faxed referral from Dr. Mary Juárez office. Called patient. She will call scheduling and have echo scheduled and then call back to schedule appointment to have 30-day event monitor placed.

## 2020-05-25 ENCOUNTER — HOSPITAL ENCOUNTER (EMERGENCY)
Age: 60
Discharge: HOME | End: 2020-05-25
Attending: EMERGENCY MEDICINE
Payer: COMMERCIAL

## 2020-05-25 VITALS
TEMPERATURE: 98.5 F | SYSTOLIC BLOOD PRESSURE: 131 MMHG | WEIGHT: 217 LBS | HEIGHT: 66 IN | RESPIRATION RATE: 18 BRPM | HEART RATE: 65 BPM | BODY MASS INDEX: 34.87 KG/M2 | DIASTOLIC BLOOD PRESSURE: 80 MMHG | OXYGEN SATURATION: 98 %

## 2020-05-25 LAB
ANION GAP SERPL CALCULATED.3IONS-SCNC: 15 MMOL/L (ref 3–16)
BASOPHILS ABSOLUTE: 0.1 K/UL (ref 0–0.2)
BASOPHILS RELATIVE PERCENT: 0.7 %
BUN BLDV-MCNC: 11 MG/DL (ref 7–20)
CALCIUM SERPL-MCNC: 9.5 MG/DL (ref 8.3–10.6)
CHLORIDE BLD-SCNC: 99 MMOL/L (ref 99–110)
CO2: 26 MMOL/L (ref 21–32)
CREAT SERPL-MCNC: 0.6 MG/DL (ref 0.6–1.1)
EOSINOPHILS ABSOLUTE: 0 K/UL (ref 0–0.6)
EOSINOPHILS RELATIVE PERCENT: 0.5 %
GFR AFRICAN AMERICAN: >60
GFR NON-AFRICAN AMERICAN: >60
GLUCOSE BLD-MCNC: 107 MG/DL (ref 70–99)
HCT VFR BLD CALC: 37.1 % (ref 36–48)
HEMOGLOBIN: 13 G/DL (ref 12–16)
LYMPHOCYTES ABSOLUTE: 1.7 K/UL (ref 1–5.1)
LYMPHOCYTES RELATIVE PERCENT: 21.3 %
MAGNESIUM: 1.8 MG/DL (ref 1.8–2.4)
MCH RBC QN AUTO: 32.5 PG (ref 26–34)
MCHC RBC AUTO-ENTMCNC: 35.1 G/DL (ref 31–36)
MCV RBC AUTO: 92.5 FL (ref 80–100)
MONOCYTES ABSOLUTE: 0.4 K/UL (ref 0–1.3)
MONOCYTES RELATIVE PERCENT: 5.3 %
NEUTROPHILS ABSOLUTE: 5.7 K/UL (ref 1.7–7.7)
NEUTROPHILS RELATIVE PERCENT: 72.2 %
PDW BLD-RTO: 14 % (ref 12.4–15.4)
PLATELET # BLD: 223 K/UL (ref 135–450)
PMV BLD AUTO: 8.5 FL (ref 5–10.5)
POTASSIUM REFLEX MAGNESIUM: 3.3 MMOL/L (ref 3.5–5.1)
RBC # BLD: 4.01 M/UL (ref 4–5.2)
SODIUM BLD-SCNC: 140 MMOL/L (ref 136–145)
WBC # BLD: 7.9 K/UL (ref 4–11)

## 2020-05-25 PROCEDURE — 83735 ASSAY OF MAGNESIUM: CPT

## 2020-05-25 PROCEDURE — 99284 EMERGENCY DEPT VISIT MOD MDM: CPT

## 2020-05-25 PROCEDURE — 6370000000 HC RX 637 (ALT 250 FOR IP): Performed by: STUDENT IN AN ORGANIZED HEALTH CARE EDUCATION/TRAINING PROGRAM

## 2020-05-25 PROCEDURE — 80048 BASIC METABOLIC PNL TOTAL CA: CPT

## 2020-05-25 PROCEDURE — 93005 ELECTROCARDIOGRAM TRACING: CPT | Performed by: STUDENT IN AN ORGANIZED HEALTH CARE EDUCATION/TRAINING PROGRAM

## 2020-05-25 PROCEDURE — 85025 COMPLETE CBC W/AUTO DIFF WBC: CPT

## 2020-05-25 RX ORDER — POTASSIUM CHLORIDE 7.45 MG/ML
10 INJECTION INTRAVENOUS ONCE
Status: DISCONTINUED | OUTPATIENT
Start: 2020-05-25 | End: 2020-05-25

## 2020-05-25 RX ORDER — POTASSIUM CHLORIDE 20 MEQ/1
40 TABLET, EXTENDED RELEASE ORAL ONCE
Status: COMPLETED | OUTPATIENT
Start: 2020-05-25 | End: 2020-05-25

## 2020-05-25 RX ORDER — ACETAMINOPHEN 325 MG/1
650 TABLET ORAL ONCE
Status: COMPLETED | OUTPATIENT
Start: 2020-05-25 | End: 2020-05-25

## 2020-05-25 RX ORDER — MECLIZINE HYDROCHLORIDE 25 MG/1
25 TABLET ORAL ONCE
Status: COMPLETED | OUTPATIENT
Start: 2020-05-25 | End: 2020-05-25

## 2020-05-25 RX ADMIN — POTASSIUM CHLORIDE 40 MEQ: 20 TABLET, EXTENDED RELEASE ORAL at 15:04

## 2020-05-25 RX ADMIN — ACETAMINOPHEN 650 MG: 325 TABLET ORAL at 14:00

## 2020-05-25 RX ADMIN — MECLIZINE HYDROCHLORIDE 25 MG: 25 TABLET ORAL at 13:01

## 2020-05-25 ASSESSMENT — PAIN SCALES - GENERAL
PAINLEVEL_OUTOF10: 4
PAINLEVEL_OUTOF10: 6
PAINLEVEL_OUTOF10: 6

## 2020-05-25 ASSESSMENT — PAIN DESCRIPTION - LOCATION
LOCATION: HEAD
LOCATION: HEAD

## 2020-05-25 ASSESSMENT — ENCOUNTER SYMPTOMS
ABDOMINAL PAIN: 0
COUGH: 0
DIARRHEA: 0
BLOOD IN STOOL: 0
PHOTOPHOBIA: 0
CONSTIPATION: 0
VOMITING: 0
NAUSEA: 0
SORE THROAT: 0
RHINORRHEA: 0

## 2020-05-25 ASSESSMENT — PAIN DESCRIPTION - PAIN TYPE
TYPE: ACUTE PAIN
TYPE: ACUTE PAIN

## 2020-05-25 NOTE — ED PROVIDER NOTES
ED Attending Attestation Note     Date of evaluation: 5/25/2020    This patient was seen by the resident. I have seen and examined the patient, agree with the workup, evaluation, management and diagnosis. The care plan has been discussed. I have reviewed the ECG and concur with the resident's interpretation. My assessment reveals a 65 yo with complaints of intermittent lightheadedness. On exam, 2+ radial pulses bilaterally, abdomen is soft and nontender.      Anant Wayne MD  05/25/20 3183

## 2020-05-25 NOTE — ED TRIAGE NOTES
Pt states that she was recently seen here for dizziness and was diagnosed with A fib. She states \"I haven't felt right since. I feel dizzy in my head, and like I can't catch my breath or get a deep breath. \"  VSS. EKG completed. Pt dressed in gown on cardiac monitor.

## 2020-05-25 NOTE — ED PROVIDER NOTES
Spoke to Patsy and discussed that per my conversation with Joshua they should initiate private pay services.     Isa Child RN     believe that she is suffering a central cause of vertigo given her symptom constellation, absence of neurologic findings on exam, and normal gait. Labs were sent. An echo will be performed to evaluate for pericardial effusion.    [CS]   1433 Labs are notable for mild hypokalemia improved from prior. No other metabolic abnormalities and she has no leukocytosis or anemia. She was treated with meclizine as well as acetaminophen for her headache and dizziness. Ultrasound was performed that showed no pericardial effusion. She was not orthostatic on bedside assessment. I recommended she cut her metoprolol dose back to 50, and follow-up with her primary care provider in the next 1 week. We discussed return precautions including syncope, chest pain, trouble breathing, or severe worsening of her headache.    [CS]      ED Course User Index  [CS] Dickson Simons MD     This patient was also evaluated by the attending physician. All care plans were discussed and agreed upon. Clinical Impression     1. Lightheadedness        Disposition     At this time the patient has been deemed safe for discharge. My customary discharge instructions including strict return precautions for worsening or new symptoms have been communicated. The patient was able to have all questions answered and was in agreement with the stated plan. PATIENT REFERRED TO:  Alexjuliana Wintersys, [address] [place] [address]  [phone]    Schedule an appointment as soon as possible for a visit in 1 week      [facility] Emergency Department  [address] [place] [id number]  [phone]  Go to   If symptoms worsen      DISCHARGE MEDICATIONS:  Discharge Medication List as of 5/25/2020  3:01 PM          DISPOSITION        Diagnostic Results     EKG   Interpreted in conjunction with emergency department physician No att. providers found  Normal sinus rhythm with a rate of 77 bpm, normal axis and intervals.   No ST deflection suggest acute ischemia. Unchanged from May 14, 2020. RADIOLOGY:  No orders to display       LABS:   Results for orders placed or performed during the hospital encounter of 05/25/20   CBC Auto Differential   Result Value Ref Range    WBC 7.9 4.0 - 11.0 K/uL    RBC 4.01 4.00 - 5.20 M/uL    Hemoglobin 13.0 12.0 - 16.0 g/dL    Hematocrit 37.1 36.0 - 48.0 %    MCV 92.5 80.0 - 100.0 fL    MCH 32.5 26.0 - 34.0 pg    MCHC 35.1 31.0 - 36.0 g/dL    RDW 14.0 12.4 - 15.4 %    Platelets 473 083 - 583 K/uL    MPV 8.5 5.0 - 10.5 fL    Neutrophils % 72.2 %    Lymphocytes % 21.3 %    Monocytes % 5.3 %    Eosinophils % 0.5 %    Basophils % 0.7 %    Neutrophils Absolute 5.7 1.7 - 7.7 K/uL    Lymphocytes Absolute 1.7 1.0 - 5.1 K/uL    Monocytes Absolute 0.4 0.0 - 1.3 K/uL    Eosinophils Absolute 0.0 0.0 - 0.6 K/uL    Basophils Absolute 0.1 0.0 - 0.2 K/uL   Basic Metabolic Panel w/ Reflex to MG   Result Value Ref Range    Sodium 140 136 - 145 mmol/L    Potassium reflex Magnesium 3.3 (L) 3.5 - 5.1 mmol/L    Chloride 99 99 - 110 mmol/L    CO2 26 21 - 32 mmol/L    Anion Gap 15 3 - 16    Glucose 107 (H) 70 - 99 mg/dL    BUN 11 7 - 20 mg/dL    CREATININE 0.6 0.6 - 1.1 mg/dL    GFR Non-African American >60 >60    GFR African American >60 >60    Calcium 9.5 8.3 - 10.6 mg/dL   Magnesium   Result Value Ref Range    Magnesium 1.80 1.80 - 2.40 mg/dL   EKG 12 Lead   Result Value Ref Range    Ventricular Rate 77 BPM    Atrial Rate 77 BPM    P-R Interval 156 ms    QRS Duration 90 ms    Q-T Interval 406 ms    QTc Calculation (Bazett) 459 ms    P Axis 59 degrees    R Axis 21 degrees    T Axis 20 degrees    Diagnosis       EKG performed in ER and to be interpreted by ER physician. Confirmed by MD, ER ([id number]),  Youlanda Lefort ([id number]) on 5/26/2020 6:03:29 AM       ED BEDSIDE ULTRASOUND:  Negative for pericardial effusion    RECENT VITALS:  BP: 131/80, Temp: 98.5 °F (36.9 °C), Pulse: 65, Resp: 18, SpO2: 98 %     Procedures     None    Past

## 2020-05-26 LAB
EKG ATRIAL RATE: 77 BPM
EKG DIAGNOSIS: NORMAL
EKG P AXIS: 59 DEGREES
EKG P-R INTERVAL: 156 MS
EKG Q-T INTERVAL: 406 MS
EKG QRS DURATION: 90 MS
EKG QTC CALCULATION (BAZETT): 459 MS
EKG R AXIS: 21 DEGREES
EKG T AXIS: 20 DEGREES
EKG VENTRICULAR RATE: 77 BPM

## 2020-06-03 ENCOUNTER — TELEPHONE (OUTPATIENT)
Dept: INTERNAL MEDICINE CLINIC | Age: 60
End: 2020-06-03

## 2020-06-04 ENCOUNTER — OFFICE VISIT (OUTPATIENT)
Dept: INTERNAL MEDICINE CLINIC | Age: 60
End: 2020-06-04
Payer: COMMERCIAL

## 2020-06-04 ENCOUNTER — HOSPITAL ENCOUNTER (OUTPATIENT)
Dept: CT IMAGING | Age: 60
Discharge: HOME | End: 2020-06-04
Payer: COMMERCIAL

## 2020-06-04 VITALS
DIASTOLIC BLOOD PRESSURE: 80 MMHG | BODY MASS INDEX: 33.89 KG/M2 | WEIGHT: 210 LBS | RESPIRATION RATE: 12 BRPM | SYSTOLIC BLOOD PRESSURE: 122 MMHG | HEART RATE: 112 BPM

## 2020-06-04 LAB
ANION GAP SERPL CALCULATED.3IONS-SCNC: 14 MMOL/L (ref 3–16)
BUN BLDV-MCNC: 8 MG/DL (ref 7–20)
CALCIUM SERPL-MCNC: 9.3 MG/DL (ref 8.3–10.6)
CHLORIDE BLD-SCNC: 102 MMOL/L (ref 99–110)
CO2: 26 MMOL/L (ref 21–32)
CREAT SERPL-MCNC: 0.8 MG/DL (ref 0.6–1.1)
GFR AFRICAN AMERICAN: >60
GFR NON-AFRICAN AMERICAN: >60
GLUCOSE BLD-MCNC: 129 MG/DL (ref 70–99)
POTASSIUM SERPL-SCNC: 3.4 MMOL/L (ref 3.5–5.1)
SODIUM BLD-SCNC: 142 MMOL/L (ref 136–145)

## 2020-06-04 PROCEDURE — 36415 COLL VENOUS BLD VENIPUNCTURE: CPT

## 2020-06-04 PROCEDURE — 70450 CT HEAD/BRAIN W/O DYE: CPT

## 2020-06-04 PROCEDURE — 99214 OFFICE O/P EST MOD 30 MIN: CPT | Performed by: INTERNAL MEDICINE

## 2020-06-04 PROCEDURE — 80048 BASIC METABOLIC PNL TOTAL CA: CPT

## 2020-06-04 RX ORDER — METOPROLOL SUCCINATE 50 MG/1
50 TABLET, EXTENDED RELEASE ORAL DAILY
Qty: 45 TABLET | Refills: 5 | Status: SHIPPED
Start: 2020-06-04 | End: 2020-07-24 | Stop reason: DRUGHIGH

## 2020-06-04 RX ORDER — MECLIZINE HCL 12.5 MG/1
12.5 TABLET ORAL 3 TIMES DAILY PRN
Qty: 30 TABLET | Refills: 0 | Status: SHIPPED | OUTPATIENT
Start: 2020-06-04 | End: 2020-06-14

## 2020-06-04 NOTE — PATIENT INSTRUCTIONS
Push fluids such as Gatorade and water    Discontinue the hydrochlorothiazide and finish the potassium. Patient Education        Dizziness: Care Instructions  Your Care Instructions  Dizziness is the feeling of unsteadiness or fuzziness in your head. It is different than having vertigo, which is a feeling that the room is spinning or that you are moving or falling. It is also different from lightheadedness, which is the feeling that you are about to faint. It can be hard to know what causes dizziness. Some people feel dizzy when they have migraine headaches. Sometimes bouts of flu can make you feel dizzy. Some medical conditions, such as heart problems or high blood pressure, can make you feel dizzy. Many medicines can cause dizziness, including medicines for high blood pressure, pain, or anxiety. If a medicine causes your symptoms, your doctor may recommend that you stop or change the medicine. If it is a problem with your heart, you may need medicine to help your heart work better. If there is no clear reason for your symptoms, your doctor may suggest watching and waiting for a while to see if the dizziness goes away on its own. Follow-up care is a key part of your treatment and safety. Be sure to make and go to all appointments, and call your doctor if you are having problems. It's also a good idea to know your test results and keep a list of the medicines you take. How can you care for yourself at home? · If your doctor recommends or prescribes medicine, take it exactly as directed. Call your doctor if you think you are having a problem with your medicine. · Do not drive while you feel dizzy. · Try to prevent falls. Steps you can take include:  ? Using nonskid mats, adding grab bars near the tub, and using night-lights. ? Clearing your home so that walkways are free of anything you might trip on.  ? Letting family and friends know that you have been feeling dizzy.  This will help them know how to your body. ? Sudden vision changes. ? Sudden trouble speaking. ? Sudden confusion or trouble understanding simple statements. ? Sudden problems with walking or balance. ? A sudden, severe headache that is different from past headaches. Call your doctor now or seek immediate medical care if:  · Vertigo occurs with a fever, a headache, or ringing in your ears. · You have new or increased nausea and vomiting. Watch closely for changes in your health, and be sure to contact your doctor if:  · Vertigo gets worse or happens more often. · Vertigo has not gotten better after 2 weeks. Where can you learn more? Go to [email or web address] and sign in to your [employer] account. Enter X136 in the [employer] box to learn more about \"Vertigo: Care Instructions. \"     If you do not have an account, please click on the \"Sign Up Now\" link. Current as of: July 29, 2019               Content Version: 12.5  © 1353-0551 [employer]. Care instructions adapted under license by [employer] . If you have questions about a medical condition or this instruction, always ask your healthcare professional. [place] [address] any warranty or liability for your use of this information. Patient Education        Cawthorne Exercises for Vertigo: Care Instructions  Your Care Instructions  Simple exercises can help you regain your balance when you have vertigo. If you have Ménière's disease, benign paroxysmal positional vertigo (BPPV), or another inner ear problem, you may have vertigo off and on. Do these exercises first thing in the morning and before you go to bed. You might get dizzy when you first start them. If this happens, try to do them for at least 5 minutes. Do a group of exercises at a time, starting at the top of the list. It may take several weeks before you can do all the exercises without feeling dizzy.   Follow-up care is a key part of your treatment and expected. Where can you learn more? Go to https://[email or web address].[email or web address] org and sign in to your [employer] account. Enter W422 in the [employer] box to learn more about \"Cawthorne Exercises for Vertigo: Care Instructions. \"     If you do not have an account, please click on the \"Sign Up Now\" link. Current as of: July 29, 2019               Content Version: 12.5  © 2006-2020 [employer]. Care instructions adapted under license by [facility] ([facility]). If you have questions about a medical condition or this instruction, always ask your healthcare professional. [place] [address] any warranty or liability for your use of this information. Patient Education        Vertigo: Exercises  Introduction  Here are some examples of exercises for you to try. The exercises may be suggested for a condition or for rehabilitation. Start each exercise slowly. Ease off the exercises if you start to have pain. You will be told when to start these exercises and which ones will work best for you. How to do the exercises  Exercise 1   1. Stand with a chair in front of you and a wall behind you. If you begin to fall, you may use them for support. 2. Stand with your feet together and your arms at your sides. 3. Move your head up and down 10 times. Exercise 2   1. Move your head side to side 10 times. Exercise 3   1. Move your head diagonally up and down 10 times. Exercise 4   1. Move your head diagonally up and down 10 times on the other side. Follow-up care is a key part of your treatment and safety. Be sure to make and go to all appointments, and call your doctor if you are having problems. It's also a good idea to know your test results and keep a list of the medicines you take. Where can you learn more? Go to [email or web address].[email or web address] org and sign in to your [employer] account.  Enter F349 in the [employer] box to learn more about \"Vertigo:

## 2020-06-04 NOTE — PROGRESS NOTES
[facility]) Physicians  Internal Medicine  Patient Encounter  Taylor Edwards D.O., [place]        Chief Complaint   Patient presents with    Hypertension       HPI: 64 y.o. female who is being seen with complaints of dizziness, nausea, headaches. Patient was last seen via video visit on 5/18/2020 following an ER visit on 5/14/2020. At that time she had presented with lightheadedness and a sensation of fluttering in her chest and heart racing. Patient was found to be in atrial fibrillation and atrial flutter with heart rates ranging from . She had been treated with IV Lopressor and was noted to have a low potassium. This was treated with potassium replacement. She had been on hydrochlorothiazide and it was thought that the hypokalemia contributed to the atrial fibrillation. It was recommended she follow-up with a cardiologist and was discharged but without any anticoagulation other than aspirin. At the time of our visit on 5/18/2020 she stated she felt great and had no symptoms. An echo and 30-day event recorder was ordered. Home blood pressure readings were ranging from 120s-130/80s-90s on Toprol XL 75 mg daily. Sabino days later she ended up back in the ER on 5/25/2020 with complaint of dizziness. She initially described this dizziness as a \"head rush\" when standing from a seated position. She had no other associated symptoms. She had no syncopal episodes. Her blood pressure was 156/96 in the emergency room. Patient was thought to have presyncopal episodes. The clinician indicated that she did not think she was experiencing a central cause of vertigo. An echo ultrasound was performed in the department to evaluate for pericardial effusion, this was negative. She was again found to have mild hypokalemia. He was given meclizine. According to the  this helped tremendously and she left the ER feeling great.   Her metoprolol dose was decreased back to 50 mg though she has been weight changes. Appetite good  HEENT: Pt denies visual and auditory changes, epistaxis, upper respiratory symptoms and dysphagia  CV: Pt denies CP, SOB, HEATON, orthopnea palpitations, LE swelling, and claudication. PULM: Pt denies cough, wheezing or sputum production  GI: Pt denies N/V/D, heart burn, rectal bleeding, or melena. NEURO: Pt denies unilateral weakness, paresthesia and dizziness. OBJECTIVE:  Vitals:    06/04/20 1039 06/04/20 1115 06/04/20 1116 06/04/20 1117   BP: 106/80 118/64 126/70 122/80   Position: Standing Supine Sitting Standing   Pulse:  80 84 112   Resp:       Weight:         Body mass index is 33.89 kg/m². Wt Readings from Last 3 Encounters:   06/04/20 210 lb (95.3 kg)   05/25/20 217 lb (98.4 kg)   05/14/20 157 lb 8 oz (71.4 kg)     BP Readings from Last 3 Encounters:   06/04/20 122/80   05/25/20 131/80   05/18/20 130/89      GEN: NAD, A&O, Non-toxic  HEENT: NC/AT, AURELIO, EOMI, Oral cavity Clear,  TM's NL, Nasal cavity clear. NECK: Supple. No thyromegaly. LYMPH: No C/SC nodes. CV: S1 S2 NL, RRR. No murmurs, clicks or rubs. PULM: CTA, symmentric air exchange  EXT: No C/C/E  GI: Abdomen is soft, NT, BS+, No hepatomegaly. NEURO: No focal or lateralizing deficits. Hallpike [place] maneuver was mildly positive with head turned to the right. VASC:  No carotid bruits. Pulses symmetric    ASSESSMENT[de-identified]  Dante Mcdowell was seen today for hypertension. Diagnoses and all orders for this visit:    Intractable headache, unspecified chronicity pattern, unspecified headache type  -     Cancel: CT HEAD WO CONTRAST; Future  -     CT HEAD WO CONTRAST; Future    Orthostatic dizziness  -     Basic Metabolic Panel; Future    Vertigo  -     Cancel: CT HEAD WO CONTRAST; Future  -     meclizine (ANTIVERT) 12.5 MG tablet; Take 1 tablet by mouth 3 times daily as needed for Dizziness (vertgo)  -     CT HEAD WO CONTRAST;  Future    White coat syndrome with diagnosis of hypertension  -     metoprolol succinate (TOPROL XL) 50 MG extended release tablet; Take 1 tablet by mouth daily    Hypokalemia  -     Basic Metabolic Panel; Future    Atrial fibrillation, unspecified type ([address]    Benign essential HTN        Etiology of her symptoms is unclear. She has symptoms described as lightheadedness as well as a sensation of motion such as vertigo. She is also describing an intractable headache. Orthostatic blood pressures and pulse suggest some degree of orthostasis with an elevated heart rate upon standing. Additional plan:  #1 Push fluids such as Gatorade and water  #2 discontinue the hydrochlorothiazide and finish the potassium. #3 stat CT scan of the brain  #4 stat basic metabolic profile  #5 patient due to see cardiology, start heart monitor, obtain echo  #6 trial of meclizine 12.5 mg 3 times daily    Discussed medications with patient who voiced understanding of their use, indication and potential side effects. Pt also understands the above recommendations. All questions answered. This note was generated completely or in part utilizing Dragon dictation speech recognition software. Occasionally, words are mistranscribed and despite editing, the text may contain inaccuracies due to incorrect word recognition.   If further clarification is needed please contact the office at [phone]

## 2020-06-08 ENCOUNTER — HOSPITAL ENCOUNTER (OUTPATIENT)
Dept: NON INVASIVE DIAGNOSTICS | Age: 60
Discharge: HOME | End: 2020-06-08
Payer: COMMERCIAL

## 2020-06-08 ENCOUNTER — TELEPHONE (OUTPATIENT)
Dept: CARDIOLOGY CLINIC | Age: 60
End: 2020-06-08

## 2020-06-08 ENCOUNTER — NURSE ONLY (OUTPATIENT)
Dept: CARDIOLOGY CLINIC | Age: 60
End: 2020-06-08

## 2020-06-08 DIAGNOSIS — E87.6 HYPOKALEMIA: ICD-10-CM

## 2020-06-08 LAB
LV EF: 58 %
LVEF MODALITY: NORMAL
POTASSIUM SERPL-SCNC: 4 MMOL/L (ref 3.5–5.1)

## 2020-06-08 PROCEDURE — 93306 TTE W/DOPPLER COMPLETE: CPT

## 2020-06-08 NOTE — PROGRESS NOTES
30 Day Medilynx monitor placed on pt today in office. Ordered by Dr. La Rosario  DX: Afib, Palpitations. Pt has UMR thus prompting ecg event monitor selection verses telemetry. Spoke to Nohemi at [facility] to ensure continuous telemetry   Nohemi confirmed. (10:43 am)  Enrolled: 10:46 am  Hook up time: 11:05 am  Tracker: 11:09 am    All questions/concerns answered at time of visit.

## 2020-06-18 ENCOUNTER — TELEPHONE (OUTPATIENT)
Dept: INTERNAL MEDICINE CLINIC | Age: 60
End: 2020-06-18

## 2020-06-18 NOTE — TELEPHONE ENCOUNTER
Pt states she finished potassium 8 days ago. Since then the headaches have started to return. She states she felt great while on Potassium and wants to restart meds.

## 2020-06-18 NOTE — TELEPHONE ENCOUNTER
The results are in My Chart. It does not look like she is reviewed it. Her potassium was perfect at 4.0 the last check. If she still having headaches and her blood pressure remains uncontrolled, bring her back in and we will continue working through this.

## 2020-06-23 ENCOUNTER — OFFICE VISIT (OUTPATIENT)
Dept: INTERNAL MEDICINE CLINIC | Age: 60
End: 2020-06-23
Payer: COMMERCIAL

## 2020-06-23 VITALS
WEIGHT: 213 LBS | TEMPERATURE: 97.7 F | DIASTOLIC BLOOD PRESSURE: 96 MMHG | SYSTOLIC BLOOD PRESSURE: 154 MMHG | HEART RATE: 77 BPM | BODY MASS INDEX: 34.38 KG/M2 | OXYGEN SATURATION: 96 %

## 2020-06-23 PROCEDURE — 99214 OFFICE O/P EST MOD 30 MIN: CPT | Performed by: INTERNAL MEDICINE

## 2020-06-23 RX ORDER — METOPROLOL SUCCINATE 100 MG/1
100 TABLET, EXTENDED RELEASE ORAL DAILY
Qty: 30 TABLET | Refills: 3 | Status: SHIPPED | OUTPATIENT
Start: 2020-06-23 | End: 2020-10-26

## 2020-06-23 RX ORDER — BUTALBITAL, ACETAMINOPHEN AND CAFFEINE 50; 325; 40 MG/1; MG/1; MG/1
1 TABLET ORAL EVERY 4 HOURS PRN
Qty: 30 TABLET | Refills: 0 | Status: SHIPPED | OUTPATIENT
Start: 2020-06-23

## 2020-06-23 NOTE — PROGRESS NOTES
[facility]) Physicians  Internal Medicine  Patient Encounter  Loyd Vitale D.O., Alexandra        Chief Complaint   Patient presents with    Headache       HPI: 64 y.o. female seen today continuing to struggle with complaints of headache and dizziness. Symptoms started about a month and a half after her checkup in April when she had visited the ER on 5/14/2020. She had presented there feeling faint, lightheaded with fluttering sensations in her chest.  She had a flushed feeling. She was noted to be in atrial fibrillation and atrial flutter with heart rates ranging from . It was noted that her potassium was 2.7 and her magnesium was 1.9. She was given IV Lopressor, IV potassium and she converted spontaneously to sinus rhythm. It was thought that her hypokalemia was due to hydrochlorothiazide which was not discontinued. During her video visit on 5/18/2020 she stated she felt great without any headaches or lightheadedness, chest pain or shortness of breath but she was still having occasional palpitations. At that visit we needed to consider paroxysmal atrial fibrillation and not just a lone episode. It was recommended she undergo a 30-day event recorder, echocardiogram and visit with electrophysiology. Patient was kept on metoprolol ER and hydrochlorothiazide along with daily potassium. She presented back to the ER on 5/25/2020 with complaints of dizziness. This was more of an orthostatic dizziness. In the department she was again found to have mild hypokalemia but was given meclizine to address her dizziness. Her metoprolol dose was decreased to 50 mg in addition to continuing the hydrochlorothiazide and potassium. At her ER follow-up on 6/4/2020 she described a spinning dizziness and lightheadedness. Describes symptoms of feeling like she was going to pass out. She reported dull headaches.     She had an echocardiogram on 6/8/2020 which showed mild LVH and mild mitral regurgitation but was otherwise unremarkable. She is being evaluated today with ongoing complaints of headache and dizziness. We had her stop the hydrochlorothiazide due to persistent hypokalemia. She finished up her potassium dose and claims that she started to feel worse off of the potassium. She wanted to go back on the potassium even though she was not on the hydrochlorothiazide. During the day she will feel good, but then she'll have a \"spurt where I feel terrible. \"  She will feel pressure in the head and even tingling. She will feel flushed. She can also feel nauseated. She felt good from 6/8-6/15. She then started to have a frontal headache. It lasted for an hour and then she felt better, but the HA returned the next day and lasted 2 hours with nausea. That HA resolved. This past Friday she felt bad-- headache over the entire head, nausea, dull ache. No improvement with Advil. She has continued to have daily headaches. Last night the headache was throbbing. Interestingly, she also states that light bothers her headache. She states she felt \"horrible. \"  Pt denies anxiety or depression. She has seen Dr. Debi Eubanks in the past.      CT scan brain was normal.        Past Medical History:   Diagnosis Date    Acute superficial venous thrombosis of right lower extremity 01/10/2019    Asymptomatic bilateral carotid artery stenosis 6/5/2016    Colon polyp 94/5888    Diastolic dysfunction     Grade 1, Echo 6/2016    GERD (gastroesophageal reflux disease)     Hemorrhoid 9/24/2013    HLD (hyperlipidemia)     Hx of blood clots     Hypertension     Impaired fasting glucose 02/06/2019    Nephrolithiasis 3/2012    Osteoarthritis of left knee     Dr. Damian Lu    Retinal tear of left eye     Seasonal allergic rhinitis 5/5/2015    TIA (transient ischemic attack) 8/5/2011    Venous insufficiency          MEDICATIONS:  Prior to Visit Medications    Medication Sig Taking?  Authorizing Provider   metoprolol succinate (TOPROL XL) 50 MG extended release tablet Take 1 tablet by mouth daily Yes Pavel Thompson, DO   aspirin EC 81 MG EC tablet Take 1 tablet by mouth daily. Yes Angelita Richmond MD           Review of Systems - As per HPI      OBJECTIVE:  Vitals:    06/23/20 1331 06/23/20 1350 06/23/20 1354   BP: (!) 162/96  (!) 144/80   Pulse: 112 92 sitting 84  sitting   Temp: 97.7 °F (36.5 °C)     SpO2: 96%     Weight: 213 lb (96.6 kg)       Body mass index is 34.38 kg/m². Wt Readings from Last 3 Encounters:   06/23/20 213 lb (96.6 kg)   06/04/20 210 lb (95.3 kg)   05/25/20 217 lb (98.4 kg)     BP Readings from Last 3 Encounters:   06/23/20 (!) 154/96   06/04/20 122/80   05/25/20 131/80      GEN: NAD, A&O, Non-toxic  HEENT: NC/AT, AURELIO, EOMI, Oral cavity Clear,  TM's NL, Nasal cavity clear. NECK: Supple. No thyromegaly. No JVD  LYMPH: No C/SC nodes. CV: Regular rhythm. Initially tachycardic but heart rate decreased to 84. Occasional ectopy  PULM: CTA, symmentric air exchange  EXT: No C/C/E  GI: Abdomen is soft, NT, BS+, No hepatomegaly. No masses. NEURO: No focal or lateralizing deficits. VASC:  No carotid bruits. Pulses symmetric  SKIN:  No rashes or lesions of concern    ASSESSMENT[de-identified]  Paz Tinoco was seen today for headache. Diagnoses and all orders for this visit:    New persistent daily headache  -     Celestina Herman MD, Neurology, [place]  -     Basic Metabolic Panel; Future  -     C-Reactive Protein; Future  -     Sedimentation Rate; Future  -     CBC Auto Differential; Future  -     butalbital-acetaminophen-caffeine (FIORICET, ESGIC) -40 MG per tablet; Take 1 tablet by mouth every 4 hours as needed for Headaches or Migraine    Benign essential HTN  -     Basic Metabolic Panel; Future  -     CBC Auto Differential; Future  -     metoprolol succinate (TOPROL XL) 100 MG extended release tablet; Take 1 tablet by mouth daily        Additional Plan:  1.   Referral to neurology to address possibility of a new migraine syndrome  2. May need MRI  3. Increase Toprol XL to 100 mg daily. Monitor for bradycardia and hypotension  4. Stay well-hydrated with electrolyte drinks  5. Short supply of Fioricet as needed for severe pain  6. Eye exam      Discussed medications with patient who voiced understanding of their use, indication and potential side effects. Pt also understands the above recommendations. All questions answered. This note was generated completely or in part utilizing Dragon dictation speech recognition software. Occasionally, words are mistranscribed and despite editing, the text may contain inaccuracies due to incorrect word recognition.   If further clarification is needed please contact the office at [phone]       Electronically signed    Dulce Damian D.O.

## 2020-06-26 DIAGNOSIS — I10 BENIGN ESSENTIAL HTN: ICD-10-CM

## 2020-06-26 DIAGNOSIS — G44.52 NEW PERSISTENT DAILY HEADACHE: ICD-10-CM

## 2020-06-26 LAB
ANION GAP SERPL CALCULATED.3IONS-SCNC: 14 MMOL/L (ref 3–16)
BASOPHILS ABSOLUTE: 0 K/UL (ref 0–0.2)
BASOPHILS RELATIVE PERCENT: 0.5 %
BUN BLDV-MCNC: 10 MG/DL (ref 7–20)
C-REACTIVE PROTEIN: 1 MG/L (ref 0–5.1)
CALCIUM SERPL-MCNC: 9 MG/DL (ref 8.3–10.6)
CHLORIDE BLD-SCNC: 106 MMOL/L (ref 99–110)
CO2: 21 MMOL/L (ref 21–32)
CREAT SERPL-MCNC: 0.6 MG/DL (ref 0.6–1.1)
EOSINOPHILS ABSOLUTE: 0.1 K/UL (ref 0–0.6)
EOSINOPHILS RELATIVE PERCENT: 1.6 %
GFR AFRICAN AMERICAN: >60
GFR NON-AFRICAN AMERICAN: >60
GLUCOSE BLD-MCNC: 97 MG/DL (ref 70–99)
HCT VFR BLD CALC: 40.2 % (ref 36–48)
HEMOGLOBIN: 13.4 G/DL (ref 12–16)
LYMPHOCYTES ABSOLUTE: 1.5 K/UL (ref 1–5.1)
LYMPHOCYTES RELATIVE PERCENT: 23.3 %
MCH RBC QN AUTO: 32 PG (ref 26–34)
MCHC RBC AUTO-ENTMCNC: 33.3 G/DL (ref 31–36)
MCV RBC AUTO: 96.3 FL (ref 80–100)
MONOCYTES ABSOLUTE: 0.4 K/UL (ref 0–1.3)
MONOCYTES RELATIVE PERCENT: 5.7 %
NEUTROPHILS ABSOLUTE: 4.4 K/UL (ref 1.7–7.7)
NEUTROPHILS RELATIVE PERCENT: 68.9 %
PDW BLD-RTO: 14.7 % (ref 12.4–15.4)
PLATELET # BLD: 198 K/UL (ref 135–450)
PMV BLD AUTO: 9 FL (ref 5–10.5)
POTASSIUM SERPL-SCNC: 4 MMOL/L (ref 3.5–5.1)
RBC # BLD: 4.18 M/UL (ref 4–5.2)
SEDIMENTATION RATE, ERYTHROCYTE: 14 MM/HR (ref 0–30)
SODIUM BLD-SCNC: 141 MMOL/L (ref 136–145)
WBC # BLD: 6.4 K/UL (ref 4–11)

## 2020-07-15 PROCEDURE — 93272 ECG/REVIEW INTERPRET ONLY: CPT | Performed by: INTERNAL MEDICINE

## 2020-07-17 ENCOUNTER — OFFICE VISIT (OUTPATIENT)
Dept: PRIMARY CARE CLINIC | Age: 60
End: 2020-07-17
Payer: COMMERCIAL

## 2020-07-17 PROCEDURE — 99211 OFF/OP EST MAY X REQ PHY/QHP: CPT | Performed by: NURSE PRACTITIONER

## 2020-07-17 NOTE — PROGRESS NOTES
[place]rick Fregoso received a viral test for COVID-19. They were educated on isolation and quarantine as appropriate. For any symptoms, they were directed to seek care from their PCP, given contact information to establish with a doctor, directed to an urgent care or the emergency room.

## 2020-07-22 LAB
SARS-COV-2: NOT DETECTED
SOURCE: NORMAL

## 2020-07-23 ENCOUNTER — TELEPHONE (OUTPATIENT)
Dept: CARDIOLOGY CLINIC | Age: 60
End: 2020-07-23

## 2020-07-23 NOTE — TELEPHONE ENCOUNTER
The final report for the event monitor has been read and scanned under the media tab. Thank you for your referral. Please feel free to contact our office with any questions at  [phone].

## 2020-10-26 RX ORDER — METOPROLOL SUCCINATE 100 MG/1
TABLET, EXTENDED RELEASE ORAL
Qty: 30 TABLET | Refills: 2 | Status: SHIPPED | OUTPATIENT
Start: 2020-10-26 | End: 2020-11-20 | Stop reason: SDUPTHER

## 2020-11-03 PROBLEM — I10 BENIGN ESSENTIAL HTN: Status: RESOLVED | Noted: 2019-02-04 | Resolved: 2020-11-03

## 2020-11-05 ENCOUNTER — OFFICE VISIT (OUTPATIENT)
Dept: PRIMARY CARE CLINIC | Age: 60
End: 2020-11-05
Payer: COMMERCIAL

## 2020-11-05 PROCEDURE — 99211 OFF/OP EST MAY X REQ PHY/QHP: CPT | Performed by: NURSE PRACTITIONER

## 2020-11-07 LAB — SARS-COV-2, NAA: NOT DETECTED

## 2020-11-20 ENCOUNTER — VIRTUAL VISIT (OUTPATIENT)
Dept: INTERNAL MEDICINE CLINIC | Age: 60
End: 2020-11-20
Payer: COMMERCIAL

## 2020-11-20 PROCEDURE — 99213 OFFICE O/P EST LOW 20 MIN: CPT | Performed by: INTERNAL MEDICINE

## 2020-11-20 RX ORDER — FLUTICASONE PROPIONATE 50 MCG
2 SPRAY, SUSPENSION (ML) NASAL DAILY
Qty: 1 BOTTLE | Refills: 2 | Status: SHIPPED | OUTPATIENT
Start: 2020-11-20

## 2020-11-20 RX ORDER — CETIRIZINE HYDROCHLORIDE 10 MG/1
10 TABLET ORAL NIGHTLY
Qty: 30 TABLET | Refills: 1 | Status: SHIPPED | OUTPATIENT
Start: 2020-11-20

## 2020-11-20 RX ORDER — METOPROLOL SUCCINATE 100 MG/1
TABLET, EXTENDED RELEASE ORAL
Qty: 90 TABLET | Refills: 1 | Status: SHIPPED | OUTPATIENT
Start: 2020-11-20 | End: 2021-05-27 | Stop reason: SDUPTHER

## 2020-11-20 RX ORDER — DOXYCYCLINE HYCLATE 100 MG
100 TABLET ORAL 2 TIMES DAILY
Qty: 20 TABLET | Refills: 0 | Status: SHIPPED | OUTPATIENT
Start: 2020-11-20 | End: 2020-11-30

## 2020-11-20 NOTE — PROGRESS NOTES
2020    [facility]) Physicians  Internal Medicine  Patient Encounter  Albaro Villareal D.O., [address] -- Audio/Visual (During GHLGT-36 public health emergency)      I discussed at this telephone/video visit is a nontraditional type of visit that we are conducting in lieu of an office visit to minimize patient risk during the coronavirus pandemic. I discussed with the patient that I would not be able to perform a full physical examination, but that in most other respects the visit would be beneficial to his/her continued medical care. He/She again gave verbal consent for us to conduct this type of visit. Chief Complaint   Patient presents with    Sinus Problem         HPI:    Jacey Osorio (:  1960) has requested an audio/video evaluation for the following concern(s): Sinus issues    64 y.o. female being evaluated via virtual video visit due to the coronavirus pandemic emergency and public health crisis and inability to see the patient face-to-face in the office. Pt is seen today complaint of persistent nasal congestion and headache. Patient recalls symptoms starting around 10/31/2020 when she developed cold symptoms. She recalls nasal congestion. On 2020 she woke feeling \"horrible. \"  She had a headache, sore throat. Her ears felt clogged and she was fatigued. She went to get a Covid test on 2020 which was negative. She did not have any fever, chills, sweats, change in taste or smell. Her appetite has been okay. She has a persistent frontal and maxillary headache and sinus congestion. She states her nasal discharge is green. Her eyes have been a little crusty. She also reports sneezing, watery and itchy eyes that have been ongoing. She is not on any allergy medication. She states she does have seasonal problems with runny nose, sneezing.     Patient is also requesting a refill on her Toprol-XL and would like to get her potassium rechecked. Otherwise, she has been feeling fairly well. Past Medical History:   Diagnosis Date    Acute superficial venous thrombosis of right lower extremity 01/10/2019    Asymptomatic bilateral carotid artery stenosis 6/5/2016    Colon polyp 45/0317    Diastolic dysfunction     Grade 1, Echo 6/2016    GERD (gastroesophageal reflux disease)     Hemorrhoid 9/24/2013    HLD (hyperlipidemia)     Hx of blood clots     Hypertension     Impaired fasting glucose 02/06/2019    Nephrolithiasis 3/2012    Osteoarthritis of left knee     Dr. Robert Dunbar    Retinal tear of left eye     Seasonal allergic rhinitis 5/5/2015    TIA (transient ischemic attack) 8/5/2011    Venous insufficiency        Prior to Visit Medications    Medication Sig Taking? Authorizing Provider   metoprolol succinate (TOPROL XL) 100 MG extended release tablet TAKE ONE TABLET BY MOUTH DAILY Yes Pavel Thompson, DO   aspirin EC 81 MG EC tablet Take 1 tablet by mouth daily. Yes Rufina Badillo MD   butalbital-acetaminophen-caffeine (FIORICET, ESGIC) -58 MG per tablet Take 1 tablet by mouth every 4 hours as needed for Headaches or Migraine  Patient not taking: Reported on 11/20/2020  Pavel Duran, DO         Review of Systems  As per [facility]      PHYSICAL EXAMINATION:    Vital Signs: (As obtained by patient/caregiver or practitioner observation)    Patient-Reported Vitals 11/20/2020   Patient-Reported Weight 212lb   Patient-Reported Systolic 507   Patient-Reported Diastolic 90   Patient-Reported Pulse 72   Patient-Reported Temperature 98.6          Wt Readings from Last 3 Encounters:   06/23/20 213 lb (96.6 kg)   06/04/20 210 lb (95.3 kg)   05/25/20 217 lb (98.4 kg)     BP Readings from Last 3 Encounters:   06/23/20 (!) 154/96   06/04/20 122/80   05/25/20 131/80           Physical Exam  Constitutional:       General: She is not in acute distress. Appearance: Normal appearance. She is not ill-appearing.    HENT:      Right Ear: External ear normal.      Left Ear: External ear normal.   Eyes:      Extraocular Movements: Extraocular movements intact. Conjunctiva/sclera: Conjunctivae normal.   Neck:      Musculoskeletal: Normal range of motion. Pulmonary:      Effort: Pulmonary effort is normal. No respiratory distress. Neurological:      Mental Status: She is alert and oriented to person, place, and time. Psychiatric:         Mood and Affect: Mood normal.         Thought Content: Thought content normal.         Judgment: Judgment normal.           Other pertinent observable physical exam findings-     Due to this being a TeleHealth encounter, evaluation of the following organ systems is limited: Vitals/Constitutional/EENT/Resp/CV/GI//MS/Neuro/Skin/Heme-Lymph-Imm. ASSESSMENT/PLAN:  1. Acute maxillary sinusitis, recurrence not specified  - fluticasone (FLONASE) 50 MCG/ACT nasal spray; 2 sprays by Each Nostril route daily  Dispense: 1 Bottle; Refill: 2  - doxycycline hyclate (VIBRA-TABS) 100 MG tablet; Take 1 tablet by mouth 2 times daily for 10 days  Dispense: 20 tablet; Refill: 0  - cetirizine (ZYRTEC) 10 MG tablet; Take 1 tablet by mouth nightly  Dispense: 30 tablet; Refill: 1    2. Seasonal allergic rhinitis, unspecified trigger  - fluticasone (FLONASE) 50 MCG/ACT nasal spray; 2 sprays by Each Nostril route daily  Dispense: 1 Bottle; Refill: 2  - cetirizine (ZYRTEC) 10 MG tablet; Take 1 tablet by mouth nightly  Dispense: 30 tablet; Refill: 1    3. Hypokalemia    - Basic Metabolic Panel; Future    4. Benign essential HTN    - metoprolol succinate (TOPROL XL) 100 MG extended release tablet; TAKE ONE TABLET BY MOUTH DAILY  Dispense: 90 tablet; Refill: 1      No follow-ups on file. An  electronic signature was used to authenticate this note.     --Shaji Thompson,  on 11/20/2020 at 1:25 PM    119}    Pursuant to the emergency declaration under the [facility], [facility] waiver authority and the Coronavirus Preparedness and Response Supplemental Appropriations Act, this Virtual  Visit was conducted, with patient's consent, to reduce the patient's risk of exposure to COVID-19 and provide continuity of care for an established patient. Services were provided through a video synchronous discussion virtually to substitute for in-person clinic visit.

## 2020-11-20 NOTE — PATIENT INSTRUCTIONS
Patient Education        Seasonal Allergies: Care Instructions  Your Care Instructions     Allergies occur when your body's defense system (immune system) overreacts to certain substances. The immune system treats a harmless substance as if it were a harmful germ or virus. Many things can cause this to happen. Examples include pollens, medicine, food, dust, animal dander, and mold. Your allergies are seasonal if you have symptoms just at certain times of the year. In that case, you are probably allergic to pollens from certain trees, grasses, or weeds. Allergies can be mild or severe. Over-the-counter allergy medicine may help with some symptoms. Read and follow all instructions on the label. Managing your allergies is an important part of staying healthy. Your doctor may suggest that you have tests to help find the cause of your allergies. When you know what things trigger your symptoms, you can avoid them. This can prevent allergy symptoms and other health problems. In some cases, immunotherapy might help. For this treatment, you get shots or use pills that have a small amount of certain allergens in them. Your body \"gets used to\" the allergen, so you react less to it over time. This kind of treatment may help prevent or reduce some allergy symptoms. Follow-up care is a key part of your treatment and safety. Be sure to make and go to all appointments, and call your doctor if you are having problems. It's also a good idea to know your test results and keep a list of the medicines you take. How can you care for yourself at home? · Be safe with medicines. Take your medicines exactly as prescribed. Call your doctor if you think you are having a problem with your medicine. · During your allergy season, keep windows closed. If you need to use air-conditioning, change or clean all filters every month. Take a shower and change your clothes after you have been outside. · Stay inside when pollen counts are high. Vacuum once or twice a week. Use a vacuum  with a HEPA filter or a double-thickness filter. When should you call for help? Give an epinephrine shot if:    · You think you are having a severe allergic reaction. After giving an epinephrine shot, call 911, even if you feel better. Call 911 if:    · You have symptoms of a severe allergic reaction. These may include:  ? Sudden raised, red areas (hives) all over your body. ? Swelling of the throat, mouth, lips, or tongue. ? Trouble breathing. ? Passing out (losing consciousness). Or you may feel very lightheaded or suddenly feel weak, confused, or restless.     · You have been given an epinephrine shot, even if you feel better. Call your doctor now or seek immediate medical care if:    · You have symptoms of an allergic reaction, such as:  ? A rash or hives (raised, red areas on the skin). ? Itching. ? Swelling. ? Belly pain, nausea, or vomiting. Watch closely for changes in your health, and be sure to contact your doctor if:    · You do not get better as expected. Where can you learn more? Go to https://[employer]Profile [employer]. org and sign in to your MDLIVE account. Enter J912 in the [facility] box to learn more about \"Seasonal Allergies: Care Instructions. \"     If you do not have an account, please click on the \"Sign Up Now\" link. Current as of: June 29, 2020               Content Version: 12.6  © 2006-2020 [employer]. Care instructions adapted under license by [facility] [employer] [facility] ([facility]). If you have questions about a medical condition or this instruction, always ask your healthcare professional. [place] [address] any warranty or liability for your use of this information. Patient Education        Sinusitis: Care Instructions  Your Care Instructions     Sinusitis is an infection of the lining of the sinus cavities in your head. Sinusitis often follows a cold.  It causes pain and pressure in your head and face. In most cases, sinusitis gets better on its own in 1 to 2 weeks. But some mild symptoms may last for several weeks. Sometimes antibiotics are needed. Follow-up care is a key part of your treatment and safety. Be sure to make and go to all appointments, and call your doctor if you are having problems. It's also a good idea to know your test results and keep a list of the medicines you take. How can you care for yourself at home? · Take an over-the-counter pain medicine, such as acetaminophen (Tylenol), ibuprofen (Advil, Motrin), or naproxen (Aleve). Read and follow all instructions on the label. · If the doctor prescribed antibiotics, take them as directed. Do not stop taking them just because you feel better. You need to take the full course of antibiotics. · Be careful when taking over-the-counter cold or flu medicines and Tylenol at the same time. Many of these medicines have acetaminophen, which is Tylenol. Read the labels to make sure that you are not taking more than the recommended dose. Too much acetaminophen (Tylenol) can be harmful. · Breathe warm, moist air from a steamy shower, a hot bath, or a sink filled with hot water. Avoid cold, dry air. Using a humidifier in your home may help. Follow the directions for cleaning the machine. · Use saline (saltwater) nasal washes to help keep your nasal passages open and wash out mucus and bacteria. You can buy saline nose drops at a grocery store or drugstore. Or you can make your own at home by adding 1 teaspoon of salt and 1 teaspoon of baking soda to 2 cups of distilled water. If you make your own, fill a bulb syringe with the solution, insert the tip into your nostril, and squeeze gently. Carina Monzonman your nose. · Put a hot, wet towel or a warm gel pack on your face 3 or 4 times a day for 5 to 10 minutes each time. · Try a decongestant nasal spray like oxymetazoline (Afrin). Do not use it for more than 3 days in a row.  Using it for more than 3 days can make your congestion worse. When should you call for help? Call your doctor now or seek immediate medical care if:    · You have new or worse swelling or redness in your face or around your eyes.     · You have a new or higher fever. Watch closely for changes in your health, and be sure to contact your doctor if:    · You have new or worse facial pain.     · The mucus from your nose becomes thicker (like pus) or has new blood in it.     · You are not getting better as expected. Where can you learn more? Go to https://[employer] Gap.redIT. org and sign in to your Pikum account. Enter N066 in the [employer] box to learn more about \"Sinusitis: Care Instructions. \"     If you do not have an account, please click on the \"Sign Up Now\" link. Current as of: April 15, 2020               Content Version: 12.6  © 5584-1294 [employer]. Care instructions adapted under license by [facility] ([facility]). If you have questions about a medical condition or this instruction, always ask your healthcare professional. [address] any warranty or liability for your use of this information.

## 2020-12-04 DIAGNOSIS — E87.6 HYPOKALEMIA: ICD-10-CM

## 2020-12-04 LAB
ANION GAP SERPL CALCULATED.3IONS-SCNC: 14 MMOL/L (ref 3–16)
BUN BLDV-MCNC: 11 MG/DL (ref 7–20)
CALCIUM SERPL-MCNC: 9 MG/DL (ref 8.3–10.6)
CHLORIDE BLD-SCNC: 105 MMOL/L (ref 99–110)
CO2: 21 MMOL/L (ref 21–32)
CREAT SERPL-MCNC: 0.7 MG/DL (ref 0.6–1.1)
GFR AFRICAN AMERICAN: >60
GFR NON-AFRICAN AMERICAN: >60
GLUCOSE BLD-MCNC: 85 MG/DL (ref 70–99)
POTASSIUM SERPL-SCNC: 3.8 MMOL/L (ref 3.5–5.1)
SODIUM BLD-SCNC: 140 MMOL/L (ref 136–145)

## 2020-12-07 ENCOUNTER — TELEPHONE (OUTPATIENT)
Dept: INTERNAL MEDICINE CLINIC | Age: 60
End: 2020-12-07

## 2021-03-29 ENCOUNTER — TELEPHONE (OUTPATIENT)
Dept: INTERNAL MEDICINE CLINIC | Age: 61
End: 2021-03-29

## 2021-03-29 ENCOUNTER — TELEPHONE (OUTPATIENT)
Dept: CARDIOLOGY CLINIC | Age: 61
End: 2021-03-29

## 2021-03-29 DIAGNOSIS — E87.6 HYPOKALEMIA: Primary | ICD-10-CM

## 2021-03-29 NOTE — TELEPHONE ENCOUNTER
Patient has Basic metabolic lab done every six months. Recently done at [facility] since our lab was closed. She wants to have done now. Can she have done at our office and does it need to be entered in system? Please call to let her know.

## 2021-03-29 NOTE — TELEPHONE ENCOUNTER
Patient is scheduled 5/18/21 to see Dr. Polo George for new patient appointment. She has had echo and event monitor. She said when she is at rest her heart is fluttering.

## 2021-05-12 NOTE — PROGRESS NOTES
[facility]   Cardiac Electrophysiology Consultation   Date: 5/18/2021  Reason for Consultation:  SVT  Consult Requesting Physician: No att. providers found     Chief Complaint:   Chief Complaint   Patient presents with    New Patient     Ref By pcp/ AF/ PSVT       HPI: Nilo Spears is a 63 y.o. female referred by Dr. Piedad Shipman for PSVT and lone episode of AF. PMH significant for HTN, HLD, GERD. In 5/2020, pt presented to ED with lightheadedness and 5 day history of dizziness and rapid palpitations, found to be in AT in the setting of hypokalemia, converted to SR after receiving IV metoprolol and potassium corrected. 22 day monitor (6/2020) showed SR with 3 runs of nonsustained SVT (longest 7 beats), no AF noted. Symptoms correlating with SR. Echo (6/2020) showed mild LVH with normal EF. Interval History: Today, she is here for evaluation of SVT. She does report occasional fluttering; however, the palpitations are short lived and she has not experienced the same symptoms that brought her to ED in 5/2020. Denies complaints of dizziness, CP, SOB, orthopnea, presyncope, or syncope. He is active playing tennis and golf. She does admit that she snores.     Past Medical History:   Diagnosis Date    Acute superficial venous thrombosis of right lower extremity 01/10/2019    Asymptomatic bilateral carotid artery stenosis 6/5/2016    Colon polyp 72/2128    Diastolic dysfunction     Grade 1, Echo 6/2016    GERD (gastroesophageal reflux disease)     Hemorrhoid 9/24/2013    HLD (hyperlipidemia)     Hx of blood clots     Hypertension     Impaired fasting glucose 02/06/2019    Nephrolithiasis 3/2012    Osteoarthritis of left knee     Dr. Flakita Sandy    Retinal tear of left eye     Seasonal allergic rhinitis 5/5/2015    TIA (transient ischemic attack) 8/5/2011    Venous insufficiency         Past Surgical History:   Procedure Laterality Date    APPENDECTOMY  1985    CHOLECYSTECTOMY, LAPAROSCOPIC N/A 7/26/2019    ROBOTIC ASSISTED LAPAROSCOPIC CHOLECYSTECTOMY performed by Jamin Noble MD at [address] Right 2012    removed scar tissue, stone was gone when they went in to get it.  RETINAL LASER  6/17/2015   [address]    x 2 on left     SKIN BIOPSY  7/2015    x2       Allergies: Allergies   Allergen Reactions    Biaxin [Clarithromycin] Nausea And Vomiting and Hives       Medication:   Prior to Admission medications    Medication Sig Start Date End Date Taking? Authorizing Provider   metoprolol succinate (TOPROL XL) 100 MG extended release tablet TAKE ONE TABLET BY MOUTH DAILY 11/20/20  Yes Pavel Thompson DO   fluticasone (FLONASE) 50 MCG/ACT nasal spray 2 sprays by Each Nostril route daily 11/20/20  Yes Pavel Thompson DO   butalbital-acetaminophen-caffeine (FIORICET, ESGIC) -40 MG per tablet Take 1 tablet by mouth every 4 hours as needed for Headaches or Migraine 6/23/20  Yes Pavel Thompson DO   aspirin EC 81 MG EC tablet Take 1 tablet by mouth daily. 8/5/11  Yes Timo Srinivasan MD   cetirizine (ZYRTEC) 10 MG tablet Take 1 tablet by mouth nightly  Patient not taking: Reported on 5/18/2021 11/20/20   Pavel Tapia [facility],        Social History:   reports that she quit smoking about 2 years ago. Her smoking use included cigarettes. She has a 17.50 pack-year smoking history. She quit smokeless tobacco use about 4 years ago. She reports current alcohol use. She reports that she does not use drugs. Family History:  family history includes Colon Cancer in her mother; High Blood Pressure in her father; Osteoporosis in her maternal grandmother. Reviewed.  Denies family history of sudden cardiac death, arrhythmia, premature CAD    Review of System:    · General ROS: negative for - chills, fever   · Psychological ROS: negative for - anxiety or depression  · Ophthalmic ROS: negative for - eye pain or loss of vision  · ENT ROS: negative for - epistaxis, headaches, nasal discharge, sore throat   · Allergy and Immunology ROS: negative for - hives, nasal congestion   · Hematological and Lymphatic ROS: negative for - bleeding problems, blood clots, bruising or jaundice  · Endocrine ROS: negative for - skin changes, temperature intolerance or unexpected weight changes  · Respiratory ROS: negative for - cough, hemoptysis, pleuritic pain, SOB, sputum changes or wheezing  · Cardiovascular ROS: Per HPI. · Gastrointestinal ROS: negative for - abdominal pain, blood in stools, diarrhea, hematemesis, melena, nausea/vomiting or swallowing difficulty/pain  · Genito-Urinary ROS: negative for - dysuria or incontinence  · Musculoskeletal ROS: negative for - joint swelling or muscle pain  · Neurological ROS: negative for - confusion, dizziness, gait disturbance, headaches, numbness/tingling, seizures, speech problems, tremors, visual changes or weakness  · Dermatological ROS: negative for - rash    Physical Examination:  Vitals:    05/18/21 0949   BP: 130/80   Pulse:        · Constitutional: Oriented. No distress. · Head: Normocephalic and atraumatic. · Mouth/Throat: Oropharynx is clear and moist.   · Eyes: Conjunctivae normal. EOM are normal.   · Neck: Normal range of motion. Neck supple. No rigidity. No JVD present. · Cardiovascular: Normal rate, regular rhythm, S1&S2 and intact distal pulses. · Pulmonary/Chest: Bilateral respiratory sounds. No wheezes. No rhonchi. · Abdominal: Soft. Bowel sounds present. No distension, No tenderness. · Musculoskeletal: No tenderness. No edema    · Lymphadenopathy: Has no cervical adenopathy. · Neurological: Alert and oriented. Cranial nerve appears intact, No Gross deficit   · Skin: Skin is warm and dry. No rash noted. · Psychiatric: Has a normal mood, affect and behavior     Labs:  Reviewed. ECG: reviewed, Sinus  Rhythm, with QRS duration 94 ms.  No pathologic Q waves, ventricular pre-excitation, or QT prolongation. Studies:   1. 22 day [employer] (6/8-7/1/20):  SR with average HR 80 (), 3 runs of SVT with longest lasting 7 beats and max rate of 118, symptoms correlating with SR    2. Echo 6/8/20:   Summary   Normal left ventricle size with mild concentric left ventricular   hypertrophy. Overall left ventricular systolic function appears normal with an ejection   fraction of 55-60%. Trivial mitral and tricuspid regurgitation    3. Stress Test 7/6/16:    Normal myocardial perfusion scan.        No findings of pharmacologic stress induced reversible ischemia. 4. Cath:  na    The MCOT, echocardiogram, stress test, and coronary angiography/PCI were reviewed by myself and used for my plan of care. Procedures:  1. None    Assessment/Plan:   1. AT RVR, symptomatic in the setting of hypokalemia  2. Nonsustained SVT, nocturnal  3. HTN, stable on Toprol  4. HLD  5. Obesity    Review of ECG from ED visit in 5/2020 is consistent with atrial tachycardia. Therefore, [address] is not indicated. Monitor showed brief nonsustained nocturnal SVT and symptoms correlating with SR. Discussed lifestyle modifications to minimize risk factors for AF. Refer for sleep study to rule out sleep apnea (nocturnal atrial tachycardia)    Continue Toprol 100 mg daily    Follow up with EP as needed    At least this opportunity discussed in detail about the multiple risk factors for atrial fibrillation and ways to delay the progression of atrial fibrillation. I also discussed about the lifestyle modifications. Continue to f/u with Dr. Dana Pennington    Thank you for allowing me to participate in the care of Michael Jacobs. All questions and concerns were addressed to the patient/family. Alternatives to my treatment were discussed. Chapin Collins, RN, am scribing for and in the presence of Dr. Ellery Nissen.  05/18/21 9:56 AM  RADHA Sanchez Joe Lopez MD, personally performed the services prescribed in this documentation as scribed by Ms. Tammie Arboleda RN in my presence and it is both accurate and complete.        Ashanti Zamora MD  Cardiac Electrophysiology  [address]

## 2021-05-18 ENCOUNTER — OFFICE VISIT (OUTPATIENT)
Dept: CARDIOLOGY CLINIC | Age: 61
End: 2021-05-18
Payer: COMMERCIAL

## 2021-05-18 VITALS
HEART RATE: 84 BPM | WEIGHT: 220.4 LBS | BODY MASS INDEX: 35.57 KG/M2 | SYSTOLIC BLOOD PRESSURE: 130 MMHG | DIASTOLIC BLOOD PRESSURE: 80 MMHG

## 2021-05-18 DIAGNOSIS — R06.83 SNORING: ICD-10-CM

## 2021-05-18 DIAGNOSIS — I47.1 PSVT (PAROXYSMAL SUPRAVENTRICULAR TACHYCARDIA) (HCC): Primary | ICD-10-CM

## 2021-05-18 DIAGNOSIS — I47.1 PAT (PAROXYSMAL ATRIAL TACHYCARDIA) (HCC): ICD-10-CM

## 2021-05-18 DIAGNOSIS — E78.5 HYPERLIPIDEMIA, UNSPECIFIED HYPERLIPIDEMIA TYPE: ICD-10-CM

## 2021-05-18 DIAGNOSIS — E66.9 OBESITY (BMI 30-39.9): ICD-10-CM

## 2021-05-18 PROCEDURE — 99204 OFFICE O/P NEW MOD 45 MIN: CPT | Performed by: INTERNAL MEDICINE

## 2021-05-18 PROCEDURE — 93000 ELECTROCARDIOGRAM COMPLETE: CPT | Performed by: INTERNAL MEDICINE

## 2021-05-27 ENCOUNTER — PATIENT MESSAGE (OUTPATIENT)
Dept: INTERNAL MEDICINE CLINIC | Age: 61
End: 2021-05-27

## 2021-05-27 DIAGNOSIS — I10 BENIGN ESSENTIAL HTN: ICD-10-CM

## 2021-05-27 RX ORDER — METOPROLOL SUCCINATE 100 MG/1
TABLET, EXTENDED RELEASE ORAL
Qty: 90 TABLET | Refills: 1 | Status: SHIPPED | OUTPATIENT
Start: 2021-05-27 | End: 2021-12-01

## 2021-05-27 NOTE — TELEPHONE ENCOUNTER
From: Zay Gomez  To: Lala Murrieta DO  Sent: 5/27/2021 1:18 PM EDT  Subject: Prescription Question    I need a refill for my Metropolol 100 mg. I had my appointment with Dr. Yohannes Melendez last week and all heart issues were good and blood pressure good. He wants me to see a sleep dr for sleep apnea. I couldn't get a consultation until August 10.      Can you please refill my prescription and refer a sleep dr? If you need to reach please call: [phone]    Fara Perales

## 2021-08-10 ENCOUNTER — OFFICE VISIT (OUTPATIENT)
Dept: PULMONOLOGY | Age: 61
End: 2021-08-10
Payer: COMMERCIAL

## 2021-08-10 VITALS
WEIGHT: 215 LBS | OXYGEN SATURATION: 98 % | HEART RATE: 93 BPM | SYSTOLIC BLOOD PRESSURE: 138 MMHG | BODY MASS INDEX: 34.55 KG/M2 | HEIGHT: 66 IN | DIASTOLIC BLOOD PRESSURE: 70 MMHG

## 2021-08-10 DIAGNOSIS — K21.9 GASTROESOPHAGEAL REFLUX DISEASE WITHOUT ESOPHAGITIS: Chronic | ICD-10-CM

## 2021-08-10 DIAGNOSIS — I51.89 DIASTOLIC DYSFUNCTION: Chronic | ICD-10-CM

## 2021-08-10 DIAGNOSIS — J30.2 SEASONAL ALLERGIC RHINITIS, UNSPECIFIED TRIGGER: Chronic | ICD-10-CM

## 2021-08-10 DIAGNOSIS — I47.1 SVT (SUPRAVENTRICULAR TACHYCARDIA) (HCC): Chronic | ICD-10-CM

## 2021-08-10 DIAGNOSIS — E66.9 OBESITY (BMI 30-39.9): Chronic | ICD-10-CM

## 2021-08-10 DIAGNOSIS — R06.83 SNORING: ICD-10-CM

## 2021-08-10 DIAGNOSIS — G47.10 HYPERSOMNIA: Primary | ICD-10-CM

## 2021-08-10 PROBLEM — I47.10 SVT (SUPRAVENTRICULAR TACHYCARDIA): Status: ACTIVE | Noted: 2021-08-10

## 2021-08-10 PROCEDURE — 99204 OFFICE O/P NEW MOD 45 MIN: CPT | Performed by: INTERNAL MEDICINE

## 2021-08-10 ASSESSMENT — ENCOUNTER SYMPTOMS
CHEST TIGHTNESS: 0
RHINORRHEA: 0
CHOKING: 0
APNEA: 1
ABDOMINAL PAIN: 0
SHORTNESS OF BREATH: 0
NAUSEA: 0
VOMITING: 0
PHOTOPHOBIA: 0
ABDOMINAL DISTENTION: 0
ALLERGIC/IMMUNOLOGIC NEGATIVE: 1
EYE PAIN: 0

## 2021-08-10 ASSESSMENT — SLEEP AND FATIGUE QUESTIONNAIRES
HOW LIKELY ARE YOU TO NOD OFF OR FALL ASLEEP IN A CAR, WHILE STOPPED FOR A FEW MINUTES IN TRAFFIC: 0
HOW LIKELY ARE YOU TO NOD OFF OR FALL ASLEEP WHILE SITTING QUIETLY AFTER LUNCH WITHOUT ALCOHOL: 0
HOW LIKELY ARE YOU TO NOD OFF OR FALL ASLEEP WHILE SITTING AND TALKING TO SOMEONE: 0
HOW LIKELY ARE YOU TO NOD OFF OR FALL ASLEEP WHEN YOU ARE A PASSENGER IN A CAR FOR AN HOUR WITHOUT A BREAK: 0
HOW LIKELY ARE YOU TO NOD OFF OR FALL ASLEEP WHILE LYING DOWN TO REST IN THE AFTERNOON WHEN CIRCUMSTANCES PERMIT: 1
NECK CIRCUMFERENCE (INCHES): 15.5
ESS TOTAL SCORE: 4
HOW LIKELY ARE YOU TO NOD OFF OR FALL ASLEEP WHILE WATCHING TV: 3
HOW LIKELY ARE YOU TO NOD OFF OR FALL ASLEEP WHILE SITTING AND READING: 0
HOW LIKELY ARE YOU TO NOD OFF OR FALL ASLEEP WHILE SITTING INACTIVE IN A PUBLIC PLACE: 0

## 2021-08-10 NOTE — PROGRESS NOTES
Aida Crespo MD, Jeremi [facility] [address]  Ninfa Kehrt CNP  Bandar Carlos CNP [place] [employer] [address]  [place] [address], [address] [facility]- [phone]   [facility] [address] [facility]. Kailee [address] [phone]     [address] [place] [address]  [address] [address]  Dept: [phone]  Loc: [phone]    Assessment:      Visit Diagnoses and Associated Orders     Hypersomnia   (New Problem)  -  Primary    needs work-up    Home Sleep Study (HST) [75755 Custom]   - Future Order         Snoring   (New Problem)      needs work-up    Home Sleep Study (HST) [80716 Custom]   - Future Order         Diastolic dysfunction   (Stable)           Gastroesophageal reflux disease without esophagitis   (Stable)           SVT (supraventricular tachycardia) (HCC)   (Stable)           Seasonal allergic rhinitis, unspecified trigger   (Stable)           Obesity (BMI 30-39.9)   (Not Stable)                  Plan:      One or more undiagnosed new problem with uncertain prognosis till final diagnosis is made. Differential diagnosis includes but not limited to: IVON, PLMD's, narcolepsy, parasomnias. Reviewed IVON (highest likelihood Dx): pathophysiology, diagnosis, complications and treatment. Instructed her not to drive if drowsy. Continue medications per her PCP and other physicians. Limit caffeine use after 3pm. Standard of care is to do in-lab PSG but insurance is mandating an inferior HST. 1 wk follow up after study to review her results. The chronic medical conditions listed are directly related to the primary diagnosis listed above. The management of the primary diagnosis affects the secondary diagnosis and vice versa. This information was analyzed to assess complexity and medical decision making in regards to further testing and management. Continue meds for: dCHF, GERD, SVT, and AR.  Pt would medically benefit from wt loss for IVON (diet, exercise, Out of Food in the Last Year:    [address] in the Last Year:    Transportation Needs:     Lack of Transportation (Medical):  Lack of Transportation (Non-Medical):    Physical Activity:     Days of Exercise per Week:     Minutes of Exercise per Session:    Stress:     Feeling of Stress :    Social Connections:     Frequency of Communication with Friends and Family:     Frequency of Social Gatherings with Friends and Family:     Attends Caoda[employer] Services:     Active Member of Clubs or Organizations:     Attends Club or Organization Meetings:     Marital Status:    Intimate Partner Violence:     Fear of Current or Ex-Partner:     Emotionally Abused:     Physically Abused:     Sexually Abused:         Current Outpatient Medications   Medication Instructions    aspirin EC 81 mg, Oral, DAILY    butalbital-acetaminophen-caffeine (FIORICET, ESGIC) -40 MG per tablet 1 tablet, Oral, EVERY 4 HOURS PRN    cetirizine (ZYRTEC) 10 mg, Oral, NIGHTLY    fluticasone (FLONASE) 50 MCG/ACT nasal spray 2 sprays, Each Nostril, DAILY    metoprolol succinate (TOPROL XL) 100 MG extended release tablet TAKE ONE TABLET BY MOUTH DAILY       Allergies as of 08/10/2021 - Fully Reviewed 08/10/2021   Allergen Reaction Noted    Biaxin [clarithromycin] Nausea And Vomiting and Hives 08/04/2011       Patient Active Problem List   Diagnosis    GERD (gastroesophageal reflux disease)    Anxiety    Hyperlipidemia    Seasonal allergic rhinitis    Tobacco abuse    Primary osteoarthritis of both knees    Vitamin D deficiency    Impaired fasting glucose    Asymptomatic bilateral carotid artery stenosis    Diastolic dysfunction    Acute superficial venous thrombosis of right lower extremity    Obesity (BMI 30-39. 9)    Venous insufficiency of both lower extremities    Varicose veins of right lower extremity with inflammation    Seasonal allergies    White coat syndrome with diagnosis of hypertension    SVT (supraventricular tachycardia) ([facility] [address]       Past Medical History:   Diagnosis Date    Acute superficial venous thrombosis of right lower extremity 01/10/2019    Asymptomatic bilateral carotid artery stenosis 6/5/2016    Colon polyp 97/1103    Diastolic dysfunction     Grade 1, Echo 6/2016    GERD (gastroesophageal reflux disease)     Hemorrhoid 9/24/2013    HLD (hyperlipidemia)     Hx of blood clots     Hypertension     Impaired fasting glucose 02/06/2019    Nephrolithiasis 3/2012    Osteoarthritis of left knee     Dr. Regine Ring    Retinal tear of left eye     Seasonal allergic rhinitis 5/5/2015    TIA (transient ischemic attack) 8/5/2011    Venous insufficiency        Past Surgical History:   Procedure Laterality Date    APPENDECTOMY  1985    CHOLECYSTECTOMY, LAPAROSCOPIC N/A 7/26/2019    ROBOTIC ASSISTED LAPAROSCOPIC CHOLECYSTECTOMY performed by Panda Dyer MD at [address] 2012    removed scar tissue, stone was gone when they went in to get it.  RETINAL LASER  6/17/2015    SHOULDER SURGERY  1997, 1998    x 2 on left     SKIN BIOPSY  7/2015    x2       Family History   Problem Relation Age of Onset    Colon Cancer Mother     High Blood Pressure Father     Osteoporosis Maternal Grandmother        Review of Systems   Constitutional: Positive for fatigue and unexpected weight change. Negative for activity change and appetite change. HENT: Positive for congestion. Negative for nosebleeds, postnasal drip, rhinorrhea and sneezing. Eyes: Negative for photophobia, pain and visual disturbance. Respiratory: Positive for apnea. Negative for choking, chest tightness and shortness of breath. Cardiovascular: Positive for palpitations. Gastrointestinal: Negative for abdominal distention, abdominal pain, nausea and vomiting. Endocrine: Negative for cold intolerance and heat intolerance.    Genitourinary: Positive for frequency. Negative for difficulty urinating, dysuria and urgency. Musculoskeletal: Negative. Negative for neck pain and neck stiffness. Skin: Negative. Allergic/Immunologic: Negative. Neurological: Negative for tremors, seizures, syncope and weakness. Hematological: Negative for adenopathy. Does not bruise/bleed easily. Psychiatric/Behavioral: Positive for sleep disturbance. Negative for agitation, behavioral problems and confusion. Objective:     Vitals:  Weight BMI   Wt Readings from Last 3 Encounters:   08/10/21 215 lb (97.5 kg)   05/18/21 220 lb 6.4 oz (100 kg)   06/23/20 213 lb (96.6 kg)    Body mass index is 34.7 kg/m². BP HR SaO2   BP Readings from Last 3 Encounters:   08/10/21 138/70   05/18/21 130/80   06/23/20 (!) 154/96    Pulse Readings from Last 3 Encounters:   08/10/21 93   05/18/21 84   06/23/20 77    SpO2 Readings from Last 3 Encounters:   08/10/21 98%   06/23/20 96%   05/25/20 98%        Physical Exam  Vitals reviewed. Constitutional:       General: She is not in acute distress. Appearance: Normal appearance. She is well-developed. She is obese. She is not toxic-appearing or diaphoretic. HENT:      Head: Normocephalic and atraumatic. Not macrocephalic and not microcephalic. Right Ear: External ear normal.      Left Ear: External ear normal.      Nose: Septal deviation and mucosal edema present. No nasal deformity. Mouth/Throat:      Lips: Pink. Mouth: Mucous membranes are moist.      Tongue: No lesions. Palate: No mass. Pharynx: Uvula midline. Uvula swelling present. No oropharyngeal exudate. Tonsils: No tonsillar exudate or tonsillar abscesses. Comments: Tonsils: normal size  Eyes:      General: Lids are normal.      Extraocular Movements: Extraocular movements intact. Conjunctiva/sclera: Conjunctivae normal.      Pupils: Pupils are equal, round, and reactive to light. Neck:      Vascular: No JVD. Trachea: Trachea normal.      Comments: Neck Circ: 15.5 inches    Cardiovascular:      Rate and Rhythm: Normal rate and regular rhythm. Heart sounds: Normal heart sounds. Pulmonary:      Effort: Pulmonary effort is normal.      Breath sounds: Normal breath sounds. Abdominal:      General: Bowel sounds are normal.   Musculoskeletal:      Cervical back: Normal range of motion. Comments: No evidence of cyanosis or clubbing of nails   Skin:     General: Skin is warm. Nails: There is no clubbing. Neurological:      General: No focal deficit present. Mental Status: She is alert. Psychiatric:         Attention and Perception: Attention normal.         Mood and Affect: Mood and affect normal.         Speech: Speech normal.         Behavior: Behavior normal. Behavior is cooperative. Thought Content:  Thought content normal.         Electronically signed by Newton Kingston MD on8/10/2021 at 4:02 PM

## 2021-08-10 NOTE — LETTER
[facility]  [address] [facility]  Phone: [phone]  Fax: [phone]           Renee Mcmahan MD      August 10, 2021     Patient: Ivan Merino   MR Number: <[id number]>   YOB: 1960   Date of Visit: 8/10/2021       Dear Dr. Juarez Person: Thank you for referring Avis Gore to me for evaluation/treatment. Below are the relevant portions of my assessment and plan of care. Visit Diagnoses and Associated Orders     Hypersomnia   (New Problem)  -  Primary    needs work-up    Home Sleep Study (HST) [96228 Custom]   - Future Order         Snoring   (New Problem)      needs work-up    Home Sleep Study (HST) [19030 Custom]   - Future Order         Diastolic dysfunction   (Stable)           Gastroesophageal reflux disease without esophagitis   (Stable)           SVT (supraventricular tachycardia) (HCC)   (Stable)           Seasonal allergic rhinitis, unspecified trigger   (Stable)           Obesity (BMI 30-39.9)   (Not Stable)                 One or more undiagnosed new problem with uncertain prognosis till final diagnosis is made. Differential diagnosis includes but not limited to: IVON, PLMD's, narcolepsy, parasomnias. Reviewed IVON (highest likelihood Dx): pathophysiology, diagnosis, complications and treatment. Instructed her not to drive if drowsy. Continue medications per her PCP and other physicians. Limit caffeine use after 3pm. Standard of care is to do in-lab PSG but insurance is mandating an inferior HST. 1 wk follow up after study to review her results. The chronic medical conditions listed are directly related to the primary diagnosis listed above. The management of the primary diagnosis affects the secondary diagnosis and vice versa. This information was analyzed to assess complexity and medical decision making in regards to further testing and management. Continue meds for: dCHF, GERD, SVT, and AR.  Pt would medically benefit from wt loss for IVON (diet, exercise, surgical). Orders Placed This Encounter   Procedures    Home Sleep Study (HST)       If you have questions, please do not hesitate to call me. I look forward to following Layton Cranker along with you.     Sincerely,        Debra Rhoades MD    CC providers:  Stalin Guerrier MD  [place]  [address] [address]  [address], [place] [address] [id number]  Via In [facility]

## 2021-08-10 NOTE — LETTER
[facility]  [address]  Phone: [phone]  Fax: [phone]           Evans Blair MD      August 10, 2021     Patient: Tia Mckeon   MR Number: <[id number]>   YOB: 1960   Date of Visit: 8/10/2021       Dear Dr. Yane Badillo: Thank you for referring Thierno Bowling to me for evaluation/treatment. Below are the relevant portions of my assessment and plan of care. Visit Diagnoses and Associated Orders     Hypersomnia   (New Problem)  -  Primary    needs work-up    Home Sleep Study (HST) [06627 Custom]   - Future Order         Snoring   (New Problem)      needs work-up    Home Sleep Study (HST) [[id number] Custom]   - Future Order               {planchoice:319411}    This information was analyzed to assess complexity and medical decision making in regards to further testing and management. {Plan options (Optional):64110142::\"Continue meds for: ***. \",\"Pt would medically benefit from wt loss for IVON (diet, exercise, surgical). \"}    Orders Placed This Encounter   Procedures    Home Sleep Study (HST)       If you have questions, please do not hesitate to call me. I look forward to following Mary Bernardo along with you.     Sincerely,        Evans Blair MD    CC providers:  Michelle Mcmahan MD  [facility]  [address] [address]  Via In [facility] [address]  [address] [place]

## 2021-08-13 ENCOUNTER — TELEPHONE (OUTPATIENT)
Dept: SLEEP CENTER | Age: 61
End: 2021-08-13

## 2021-08-13 NOTE — TELEPHONE ENCOUNTER
Called to schedule an hst per Concepción Leiva.      Left vm for the pt to return my call.     r insurance

## 2021-10-11 PROCEDURE — 90471 IMMUNIZATION ADMIN: CPT | Performed by: INTERNAL MEDICINE

## 2021-10-11 PROCEDURE — 90674 CCIIV4 VAC NO PRSV 0.5 ML IM: CPT | Performed by: INTERNAL MEDICINE

## 2021-12-01 ENCOUNTER — E-VISIT (OUTPATIENT)
Dept: INTERNAL MEDICINE CLINIC | Age: 61
End: 2021-12-01
Payer: COMMERCIAL

## 2021-12-01 DIAGNOSIS — J01.90 ACUTE SINUSITIS, RECURRENCE NOT SPECIFIED, UNSPECIFIED LOCATION: Primary | ICD-10-CM

## 2021-12-01 DIAGNOSIS — I10 BENIGN ESSENTIAL HTN: ICD-10-CM

## 2021-12-01 PROCEDURE — 99442 PR PHYS/QHP TELEPHONE EVALUATION 11-20 MIN: CPT | Performed by: INTERNAL MEDICINE

## 2021-12-01 RX ORDER — METOPROLOL SUCCINATE 100 MG/1
TABLET, EXTENDED RELEASE ORAL
Qty: 90 TABLET | Refills: 1 | Status: SHIPPED | OUTPATIENT
Start: 2021-12-01 | End: 2022-06-02 | Stop reason: SDUPTHER

## 2021-12-01 RX ORDER — ECHINACEA PURPUREA EXTRACT 125 MG
3 TABLET ORAL 3 TIMES DAILY
COMMUNITY
Start: 2021-12-01

## 2021-12-01 RX ORDER — AMOXICILLIN AND CLAVULANATE POTASSIUM 875; 125 MG/1; MG/1
1 TABLET, FILM COATED ORAL 2 TIMES DAILY
Qty: 14 TABLET | Refills: 0 | Status: SHIPPED | OUTPATIENT
Start: 2021-12-01 | End: 2021-12-08

## 2021-12-01 RX ORDER — SACCHAROMYCES BOULARDII 250 MG
250 CAPSULE ORAL 2 TIMES DAILY
Qty: 28 CAPSULE | Refills: 0 | COMMUNITY
Start: 2021-12-01 | End: 2021-12-15

## 2021-12-01 RX ORDER — FLUTICASONE PROPIONATE 50 MCG
2 SPRAY, SUSPENSION (ML) NASAL DAILY
Qty: 16 G | Refills: 0 | COMMUNITY
Start: 2021-12-01

## 2021-12-01 ASSESSMENT — LIFESTYLE VARIABLES
PACKS_PER_DAY: .5
SMOKING_YEARS: 35
SMOKING_STATUS: NO, I'M A FORMER SMOKER

## 2021-12-06 ENCOUNTER — NURSE ONLY (OUTPATIENT)
Dept: INTERNAL MEDICINE CLINIC | Age: 61
End: 2021-12-06

## 2021-12-06 DIAGNOSIS — R05.9 COUGH: ICD-10-CM

## 2021-12-07 ENCOUNTER — PATIENT MESSAGE (OUTPATIENT)
Dept: INTERNAL MEDICINE CLINIC | Age: 61
End: 2021-12-07

## 2021-12-07 LAB — SARS-COV-2, PCR: NOT DETECTED

## 2021-12-07 RX ORDER — FLUCONAZOLE 150 MG/1
150 TABLET ORAL ONCE
Qty: 2 TABLET | Refills: 0 | Status: SHIPPED | OUTPATIENT
Start: 2021-12-07 | End: 2021-12-07

## 2021-12-07 NOTE — TELEPHONE ENCOUNTER
From: Pily Vo  To: Dr. Shivam Painter: 12/7/2021 1:30 PM EST  Subject: Yeast Infection    I think I have a yeast infection from the Augmentin - could I get a Diflucin tablet?     Nishant Olmos

## 2021-12-07 NOTE — TELEPHONE ENCOUNTER
Patient wants to know if she needs to finish the Augmentin.  She says she still doesn't feel that great but because of the yeast infection she is questioning wether she should finish it or a new one prescribed

## 2022-01-06 ENCOUNTER — PATIENT MESSAGE (OUTPATIENT)
Dept: INTERNAL MEDICINE CLINIC | Age: 62
End: 2022-01-06

## 2022-01-06 NOTE — LETTER
2022      Keny Murillo   [address] [facility] [address]     -- 1960      To whom it may concern:    I am writing to you on behalf of the above-named patient who recently suffered an illness consistent with COVID-19 infection. The patient did test positive for the SARS-CoV-2 virus (COVID-19) on 2022. I do not recommend repeat testing. The patient could remain positive on a PCR test for up to 90 days. Patient will be out of her isolation period on 2022 as she will no longer be infectious.       Sincerely,          Dr. Regine Patel DO, [address] [facility]

## 2022-01-06 NOTE — TELEPHONE ENCOUNTER
From: Theresa Dos Santos  To: Dr. Robledo Nephew: 1/6/2022 4:52 PM EST  Subject: Still not feeling well    I went to an urgent care Tuesday morning and tested negative for Flu, type A and B and Covid (Quick Lui antigen test) The dr said I had a sinus infection and prescribed doxycycline. My throat is still really sore and I have had a headache and pressure in my nasal cavity. I feel like there is no improvement since Tuesday at noon. Do you think I might have strep throat, or Covid? I called to get the test at your place but they said it was booked. I waited 3 hours at the urgent care and can't do that again. What do you think I should do?     [facility]

## 2022-05-28 ENCOUNTER — TELEMEDICINE (OUTPATIENT)
Dept: INTERNAL MEDICINE CLINIC | Age: 62
End: 2022-05-28
Payer: COMMERCIAL

## 2022-05-28 DIAGNOSIS — J06.9 URI, ACUTE: Primary | ICD-10-CM

## 2022-05-28 PROCEDURE — 99442 PR PHYS/QHP TELEPHONE EVALUATION 11-20 MIN: CPT | Performed by: INTERNAL MEDICINE

## 2022-05-28 RX ORDER — BENZONATATE 100 MG/1
100 CAPSULE ORAL 3 TIMES DAILY PRN
Qty: 30 CAPSULE | Refills: 0 | Status: SHIPPED | OUTPATIENT
Start: 2022-05-28 | End: 2022-06-07

## 2022-05-28 RX ORDER — AMOXICILLIN AND CLAVULANATE POTASSIUM 875; 125 MG/1; MG/1
1 TABLET, FILM COATED ORAL 2 TIMES DAILY
Qty: 20 TABLET | Refills: 0 | Status: SHIPPED | OUTPATIENT
Start: 2022-05-28 | End: 2022-06-07

## 2022-05-28 NOTE — PROGRESS NOTES
Conrad Connell is a 63 y.o. female evaluated via telephone on 5/28/2022 for Pharyngitis  . Documentation:  I communicated with the patient and/or health care decision maker about cough, sore throat and congestion. Details of this discussion including any medical advice provided:   Patient has felt unwell for the past 3-4 days. She had possible sick exposures due to attending a wedding last week, her  was sick a week ago, and her good friend with whom she had a meal is also sick. She states they have all tested negative for COVID. She also just tested negative for COVID at home earlier today. She states her throat felt sore on Monday, and last night her nose started to clog up. Her ears also feel clogged. She feels like she is having a lot of postnasal drip and it is draining into her chest.  She had 2 COVID vaccines with the last one in November. She states the drainage is thick and white. She denies much headache. She felt feverish last night. She does not feel short of breath. She does not have a pulse oximeter. Her blood pressure is currently 128/85. Total Time: 20 minutes    Patient is allergic to Biaxin. She has a history of hypertension. Assessment/Plan:  Tsering Velarde was seen today for pharyngitis. Diagnoses and all orders for this visit:    URI, acute  Told her we will treat for pharyngitis, possible sinusitis with postnasal drip and cough. -     amoxicillin-clavulanate (AUGMENTIN) 875-125 MG per tablet; Take 1 tablet by mouth 2 times daily for 10 days Take with meals. -     benzonatate (TESSALON) 100 MG capsule; Take 1 capsule by mouth 3 times daily as needed for Cough. She states over-the-counter cough meds sometimes make her jittery. Told her to let me know if not improving. Conrad Connell was evaluated through a synchronous (real-time) audio encounter. Patient identification was verified at the start of the visit.  She (or guardian if applicable) is aware that this is a billable service, which includes applicable co-pays. This visit was conducted with the patient's (and/or legal guardian's) verbal consent. She has not had a related appointment within my department in the past 7 days or scheduled within the next 24 hours.    The patient was located at home    The provider was located at home    Note: not billable if this call serves to triage the patient into an appointment for the relevant concern    Ekaterina Taylor MD

## 2022-06-02 ENCOUNTER — PATIENT MESSAGE (OUTPATIENT)
Dept: INTERNAL MEDICINE CLINIC | Age: 62
End: 2022-06-02

## 2022-06-02 DIAGNOSIS — I10 BENIGN ESSENTIAL HTN: ICD-10-CM

## 2022-06-02 RX ORDER — METOPROLOL SUCCINATE 100 MG/1
TABLET, EXTENDED RELEASE ORAL
Qty: 30 TABLET | Refills: 0 | Status: SHIPPED | OUTPATIENT
Start: 2022-06-02 | End: 2022-07-01 | Stop reason: SDUPTHER

## 2022-06-02 NOTE — TELEPHONE ENCOUNTER
Last appointment: 12/1/2021  Next appointment: Visit date not found  Last refill: 12/1/2021  Sent [employer] message to schedule due/overdue appointment.

## 2022-06-02 NOTE — TELEPHONE ENCOUNTER
From: Toño Rhodes  To: Dr. Lionel Winters: 6/2/2022 7:49 AM EDT  Subject: Metropolol    I need a refill - only have 3 pills left    Thank-you  Marcelo Morocho

## 2022-06-30 ENCOUNTER — PATIENT MESSAGE (OUTPATIENT)
Dept: INTERNAL MEDICINE CLINIC | Age: 62
End: 2022-06-30

## 2022-06-30 DIAGNOSIS — I10 BENIGN ESSENTIAL HTN: ICD-10-CM

## 2022-07-01 RX ORDER — METOPROLOL SUCCINATE 100 MG/1
TABLET, EXTENDED RELEASE ORAL
Qty: 30 TABLET | Refills: 0 | Status: SHIPPED | OUTPATIENT
Start: 2022-07-01 | End: 2022-08-11 | Stop reason: SDUPTHER

## 2022-07-01 NOTE — TELEPHONE ENCOUNTER
Last appointment: 12/1/2021  Next appointment: Visit date not found  Last refill: 6/2/2022  patient will call to schedule

## 2022-07-01 NOTE — TELEPHONE ENCOUNTER
From: Rock Stubbs  To: Dr. Tricia Mijares: 6/30/2022 7:04 PM EDT  Subject: Metropolol    I forgot to schedule my appointment and only have 3 pills left. an you please refill and I will call on Friday?     Mckayla Herrera

## 2022-08-11 DIAGNOSIS — I10 BENIGN ESSENTIAL HTN: ICD-10-CM

## 2022-08-11 RX ORDER — METOPROLOL SUCCINATE 100 MG/1
TABLET, EXTENDED RELEASE ORAL
Qty: 30 TABLET | Refills: 0 | Status: SHIPPED | OUTPATIENT
Start: 2022-08-11 | End: 2022-08-26 | Stop reason: SDUPTHER

## 2022-08-11 NOTE — TELEPHONE ENCOUNTER
----- Message from Halle Dinahnai sent at 8/11/2022  2:12 PM EDT -----  Subject: Refill Request    QUESTIONS  Name of Medication? metoprolol succinate (TOPROL XL) 100 MG extended   release tablet  Patient-reported dosage and instructions? 100 mg take 1 time a day   How many days do you have left? 2  [facility]? [facility] [id number]  Pharmacy phone number (if available)? [phone]  Additional Information for Provider? pt is asking for a refill to last her   to her appointment she has scheduled for 8/30. Please reach out to patient   she only have 2 days worth medication left ,  ---------------------------------------------------------------------------  --------------  CALL BACK INFO  What is the best way for the office to contact you? OK to leave message on   voicemail  Preferred Call Back Phone Number? [phone]  ---------------------------------------------------------------------------  --------------  SCRIPT ANSWERS  Relationship to Patient?  Self

## 2022-08-26 DIAGNOSIS — I10 BENIGN ESSENTIAL HTN: ICD-10-CM

## 2022-08-26 RX ORDER — METOPROLOL SUCCINATE 100 MG/1
TABLET, EXTENDED RELEASE ORAL
Qty: 30 TABLET | Refills: 0 | Status: SHIPPED | OUTPATIENT
Start: 2022-08-26 | End: 2022-10-11

## 2022-08-26 NOTE — TELEPHONE ENCOUNTER
----- Message from Medigus Every sent at 8/26/2022 12:01 PM EDT -----  Subject: Refill Request    QUESTIONS  Name of Medication? metoprolol succinate (TOPROL XL) 100 MG extended   release tablet  Patient-reported dosage and instructions? 100 mg in the AM  How many days do you have left? 7  Preferred Pharmacy? [facility] [id number]  Pharmacy phone number (if available)? [phone]  ---------------------------------------------------------------------------  --------------  Nghia MALIN  What is the best way for the office to contact you? OK to leave message on   voicemail  Preferred Call Back Phone Number? [phone]  ---------------------------------------------------------------------------  --------------  SCRIPT ANSWERS  Relationship to Patient?  Self

## 2022-10-11 DIAGNOSIS — I10 BENIGN ESSENTIAL HTN: ICD-10-CM

## 2022-10-11 RX ORDER — METOPROLOL SUCCINATE 100 MG/1
TABLET, EXTENDED RELEASE ORAL
Qty: 30 TABLET | Refills: 0 | Status: SHIPPED | OUTPATIENT
Start: 2022-10-11

## 2022-10-31 ENCOUNTER — OFFICE VISIT (OUTPATIENT)
Dept: INTERNAL MEDICINE CLINIC | Age: 62
End: 2022-10-31
Payer: COMMERCIAL

## 2022-10-31 VITALS
RESPIRATION RATE: 12 BRPM | TEMPERATURE: 97.7 F | OXYGEN SATURATION: 95 % | DIASTOLIC BLOOD PRESSURE: 72 MMHG | SYSTOLIC BLOOD PRESSURE: 138 MMHG | HEART RATE: 86 BPM

## 2022-10-31 DIAGNOSIS — J02.9 PHARYNGITIS, UNSPECIFIED ETIOLOGY: ICD-10-CM

## 2022-10-31 DIAGNOSIS — M65.4 DE QUERVAIN'S TENOSYNOVITIS, LEFT: Primary | ICD-10-CM

## 2022-10-31 DIAGNOSIS — R22.2 SUBCUTANEOUS MASS OF ABDOMINAL WALL: ICD-10-CM

## 2022-10-31 PROCEDURE — 3074F SYST BP LT 130 MM HG: CPT | Performed by: INTERNAL MEDICINE

## 2022-10-31 PROCEDURE — 3078F DIAST BP <80 MM HG: CPT | Performed by: INTERNAL MEDICINE

## 2022-10-31 PROCEDURE — 99214 OFFICE O/P EST MOD 30 MIN: CPT | Performed by: INTERNAL MEDICINE

## 2022-10-31 RX ORDER — NAPROXEN 500 MG/1
500 TABLET ORAL 2 TIMES DAILY WITH MEALS
Qty: 60 TABLET | Refills: 0 | Status: SHIPPED | OUTPATIENT
Start: 2022-10-31

## 2022-10-31 NOTE — PROGRESS NOTES
[facility]) Physicians  Internal Medicine  Patient Encounter  Paola Lamb D.O., [facility]        Chief Complaint   Patient presents with    Pharyngitis     3-4 days     Mass     Next to belly button       HPI: 63 y.o. female seen today with concerns about a mass next to her bellybutton. Her visit was changed to a car side due to a complaint of sore throat. She actually states she is not concerned about her sore throat. She did not do a COVID-19 test.  She denies any cough, congestion in her chest, change in taste or smell, diarrhea, fever, chills, sweats. She is also complaining of a lump underneath the skin just to the right of her umbilicus. She felt this in the shower. She denies any visible defect. She has had laparoscopy in the past.  She denies any pain associated with this lump. She is also complaining of left wrist pain located on the lateral portion of the wrist.  This started back in May after a round of golf. She does report intermittent swelling. The pain waxes and wanes. When elevated she has trouble using the wrist.  She denies any numbness or tingling or decreased  strength. Past Medical History:   Diagnosis Date    Acute superficial venous thrombosis of right lower extremity 01/10/2019    Asymptomatic bilateral carotid artery stenosis 6/5/2016    Colon polyp 31/1385    Diastolic dysfunction     Grade 1, Echo 6/2016    GERD (gastroesophageal reflux disease)     Hemorrhoid 9/24/2013    HLD (hyperlipidemia)     Hx of blood clots     Hypertension     Impaired fasting glucose 02/06/2019    Nephrolithiasis 3/2012    Osteoarthritis of left knee     Dr. Lisa Avelar    Retinal tear of left eye     Seasonal allergic rhinitis 5/5/2015    TIA (transient ischemic attack) 8/5/2011    Venous insufficiency          MEDICATIONS:  Prior to Visit Medications    Medication Sig Taking?  Authorizing Provider   naproxen (NAPROSYN) 500 MG tablet Take 1 tablet by mouth 2 times daily (with meals) Yes Pavel Thompson DO   metoprolol succinate (TOPROL XL) 100 MG extended release tablet TAKE ONE TABLET BY MOUTH DAILY Yes Pavel Thompson DO   fluticasone (FLONASE) 50 MCG/ACT nasal spray 2 sprays by Each Nostril route daily Yes Pavel Thompson DO   sodium chloride (OCEAN) 0.65 % nasal spray 3 sprays by Nasal route 3 times daily Yes Pavel Thompson DO   fluticasone (FLONASE) 50 MCG/ACT nasal spray 2 sprays by Each Nostril route daily Yes Pavel Thompson DO   cetirizine (ZYRTEC) 10 MG tablet Take 1 tablet by mouth nightly Yes Pavel Thompson DO   butalbital-acetaminophen-caffeine (FIORICET, ESGIC) -40 MG per tablet Take 1 tablet by mouth every 4 hours as needed for Headaches or Migraine Yes Pavel Thompson DO   aspirin EC 81 MG EC tablet Take 1 tablet by mouth daily. Yes Vic Evangelista MD           Review of Systems - As per HPI      OBJECTIVE:  There were no vitals filed for this visit. There is no height or weight on file to calculate BMI. Wt Readings from Last 3 Encounters:   08/10/21 215 lb (97.5 kg)   05/18/21 220 lb 6.4 oz (100 kg)   06/23/20 213 lb (96.6 kg)     BP Readings from Last 3 Encounters:   08/10/21 138/70   05/18/21 130/80   06/23/20 (!) 154/96        GEN: NAD, A&O, Non-toxic  HEENT: NC/AT, AURELIO, EOMI, Oral cavity Clear, posterior pharyngeal erythema, no exudates. TMs normal.  NECK: Supple. No thyromegaly. No JVD  LYMPH: No C/SC nodes. CV: S1 S2 NL, RRR. No murmurs, clicks or rubs. PULM: CTA, symmentric air exchange  EXT: No edema  GI: Abdomen is soft, NT, BS+, No hepatomegaly. No masses. Small subcutaneous nodular mass just under a previous laparoscopy scar to the right of the umbilicus. The mass is not reducible. Firm texture. NEURO: No focal or lateralizing deficits. VASC:  No carotid bruits.   Pulses symmetric  SKIN:  No rashes or lesions of concern  MS: Patient with tenderness to palpation of the first dorsal compartment involving the extensor pollicis longus and abductor kalen grissom. + Finkelstein's test      ASSESSMENT/PLAN:    1. De Quervain's tenosynovitis, left  Patient has what appears to be a mild case of de Quervain's tenosynovitis  Naproxen  Avoid repetitive use  Advised patient to call should symptoms persist, worsen or if new symptoms develop  Home exercises  - naproxen (NAPROSYN) 500 MG tablet; Take 1 tablet by mouth 2 times daily (with meals)  Dispense: 60 tablet; Refill: 0    2. Subcutaneous mass of abdominal wall  Suspect this represents some scar tissue versus epidermoid cyst  Differential would include a small incisional hernia  Monitor for pain    3. Pharyngitis, unspecified etiology  Recommend over-the-counter remedies such as DayQuil and NyQuil or Mucinex  Stay well-hydrated  COVID-19 test  - COVID-19; Future      Advised patient to call should symptoms persist, worsen or if new symptoms develop. Discussed medications with patient who voiced understanding of their use, indication and potential side effects. Pt also understands the above recommendations. All questions answered. This note was generated completely or in part utilizing Dragon dictation speech recognition software. Occasionally, words are mistranscribed and despite editing, the text may contain inaccuracies due to incorrect word recognition.   If further clarification is needed please contact the office at [phone]       Electronically signed    Billy Pederson D.O.

## 2022-11-01 ENCOUNTER — TELEPHONE (OUTPATIENT)
Dept: INTERNAL MEDICINE CLINIC | Age: 62
End: 2022-11-01

## 2022-11-01 LAB — SARS-COV-2: NOT DETECTED

## 2022-11-01 NOTE — TELEPHONE ENCOUNTER
Patient states she saw in [address] her COVID results are in. Please contact her with these results when possible.

## 2022-11-01 NOTE — TELEPHONE ENCOUNTER
Patient has been advised her results have are negative. She will call if she isn't feeling any better.

## 2022-11-01 NOTE — TELEPHONE ENCOUNTER
Patient has been advised her results are not back yet this morning. I will check again this afternoon.

## 2022-11-09 DIAGNOSIS — I10 BENIGN ESSENTIAL HTN: ICD-10-CM

## 2022-11-09 RX ORDER — METOPROLOL SUCCINATE 100 MG/1
TABLET, EXTENDED RELEASE ORAL
Qty: 30 TABLET | Refills: 0 | Status: SHIPPED | OUTPATIENT
Start: 2022-11-09

## 2022-12-08 DIAGNOSIS — I10 BENIGN ESSENTIAL HTN: ICD-10-CM

## 2022-12-08 RX ORDER — METOPROLOL SUCCINATE 100 MG/1
TABLET, EXTENDED RELEASE ORAL
Qty: 90 TABLET | Refills: 0 | Status: SHIPPED | OUTPATIENT
Start: 2022-12-08

## 2022-12-08 NOTE — TELEPHONE ENCOUNTER
Last appointment: 10/31/2022  Next appointment: Visit date not found  Last refill: 11/9/22   Sent [employer] message to schedule due/overdue appointment.      Last multiple appts have been urgent visits

## 2023-01-12 ENCOUNTER — TELEPHONE (OUTPATIENT)
Dept: INTERNAL MEDICINE CLINIC | Age: 63
End: 2023-01-12

## 2023-01-12 ENCOUNTER — E-VISIT (OUTPATIENT)
Dept: INTERNAL MEDICINE CLINIC | Age: 63
End: 2023-01-12
Payer: COMMERCIAL

## 2023-01-12 DIAGNOSIS — J06.9 URI WITH COUGH AND CONGESTION: Primary | ICD-10-CM

## 2023-01-12 DIAGNOSIS — B34.9 VIRAL SYNDROME: ICD-10-CM

## 2023-01-12 LAB
RAPID INFLUENZA  B AGN: NEGATIVE
RAPID INFLUENZA A AGN: NEGATIVE
RSV RAPID ANTIGEN: NEGATIVE

## 2023-01-12 PROCEDURE — 99442 PR PHYS/QHP TELEPHONE EVALUATION 11-20 MIN: CPT | Performed by: INTERNAL MEDICINE

## 2023-01-12 RX ORDER — DOXYCYCLINE HYCLATE 100 MG
100 TABLET ORAL 2 TIMES DAILY
Qty: 20 TABLET | Refills: 0 | Status: SHIPPED | OUTPATIENT
Start: 2023-01-12 | End: 2023-01-22

## 2023-01-12 ASSESSMENT — LIFESTYLE VARIABLES
PACKS_PER_DAY: 1
SMOKING_STATUS: NO, I'M A FORMER SMOKER
SMOKING_YEARS: 30

## 2023-01-12 NOTE — TELEPHONE ENCOUNTER
Patient will be here at 2:00 today to be tested for COVID/RSV/ Influenza. She states her at home COVID test is negative.

## 2023-01-12 NOTE — PROGRESS NOTES
HPI: as per patient provided history  Exam: N/A (electronic visit)  ASSESSMENT/PLAN:  Diagnoses and all orders for this visit:    URI with cough and congestion  -     doxycycline hyclate (VIBRA-TABS) 100 MG tablet; Take 1 tablet by mouth 2 times daily for 10 days  -     COVID-19; Future  -     Rapid RSV Antigen; Future  -     RAPID INFLUENZA A/B ANTIGENS; Future    Viral syndrome  -     COVID-19; Future  -     Rapid RSV Antigen; Future  -     RAPID INFLUENZA A/B ANTIGENS; Future      Advised patient to use Mucinex DM as well as Coricidin HBP. Advised patient to stay well-hydrated    Patient instructed to call the office if worsens, or fails to improve as anticipated. 11-20 (12) minutes were spent on the digital evaluation and management of this patient.     [address] [place] [address]
Vaginal Delivery

## 2023-01-12 NOTE — TELEPHONE ENCOUNTER
Patient is calling with concerns of cold-like sxs that began around [place]. Patient states her sxs seemed to go away after a few days but are back now and they include:  Clogged Ears  ST  Nasal Congestion  Coughing- yellow and green mucus  MONSIVAIS  Chills    Patient did take a COVID test when sxs first began and it was negative. She is going to take another one today and call back with the results. Advised patient to complete an E-visit for this and she states she will try but has been having trouble getting into her MyChart. Patient is scheduled for VV to address tomorrow morning but will cancel her appt if she does submit an e-visit.

## 2023-01-13 LAB — SARS-COV-2: NOT DETECTED

## 2023-01-20 ENCOUNTER — TELEPHONE (OUTPATIENT)
Dept: INTERNAL MEDICINE CLINIC | Age: 63
End: 2023-01-20

## 2023-01-20 NOTE — TELEPHONE ENCOUNTER
Patient is calling and asking to speak with Dr. Thompson when possible. Patient would not provide me with additional information other than she has \"questions about some sxs.\" Please contact her back when possible.

## 2023-03-10 DIAGNOSIS — I10 BENIGN ESSENTIAL HTN: ICD-10-CM

## 2023-03-10 RX ORDER — METOPROLOL SUCCINATE 100 MG/1
TABLET, EXTENDED RELEASE ORAL
Qty: 90 TABLET | Refills: 1 | Status: SHIPPED | OUTPATIENT
Start: 2023-03-10

## 2023-03-10 NOTE — TELEPHONE ENCOUNTER
Last appointment: 1/12/2023  Next appointment: Visit date not found  Last refill: 12/8/2022      Left a message for patient to call to schedule

## 2023-09-06 DIAGNOSIS — I10 BENIGN ESSENTIAL HTN: ICD-10-CM

## 2023-09-06 RX ORDER — METOPROLOL SUCCINATE 100 MG/1
TABLET, EXTENDED RELEASE ORAL
Qty: 90 TABLET | Refills: 0 | Status: SHIPPED | OUTPATIENT
Start: 2023-09-06

## 2023-09-06 NOTE — TELEPHONE ENCOUNTER
Last appointment: 1/12/2023  Next appointment: Visit date not found  Last refill: 3/10/2023  Left message for patient to call and schedule due/overdue appointment.

## 2023-12-01 DIAGNOSIS — I10 BENIGN ESSENTIAL HTN: ICD-10-CM

## 2023-12-04 DIAGNOSIS — I10 BENIGN ESSENTIAL HTN: ICD-10-CM

## 2023-12-04 RX ORDER — METOPROLOL SUCCINATE 100 MG/1
TABLET, EXTENDED RELEASE ORAL
Qty: 90 TABLET | Refills: 1 | OUTPATIENT
Start: 2023-12-04

## 2023-12-04 RX ORDER — METOPROLOL SUCCINATE 100 MG/1
100 TABLET, EXTENDED RELEASE ORAL DAILY
Qty: 90 TABLET | Refills: 0 | Status: SHIPPED | OUTPATIENT
Start: 2023-12-04

## 2024-02-26 DIAGNOSIS — I10 BENIGN ESSENTIAL HTN: ICD-10-CM

## 2024-02-27 RX ORDER — METOPROLOL SUCCINATE 100 MG/1
100 TABLET, EXTENDED RELEASE ORAL DAILY
Qty: 90 TABLET | Refills: 0 | Status: SHIPPED | OUTPATIENT
Start: 2024-02-27

## 2024-02-28 DIAGNOSIS — M65.4 DE QUERVAIN'S TENOSYNOVITIS, LEFT: ICD-10-CM

## 2024-02-29 RX ORDER — NAPROXEN 500 MG/1
500 TABLET ORAL 2 TIMES DAILY WITH MEALS
Qty: 60 TABLET | Refills: 0 | OUTPATIENT
Start: 2024-02-29

## 2024-02-29 NOTE — TELEPHONE ENCOUNTER
Future Appointments   Date Time Provider Department Center   6/11/2024  3:00 PM Pavel Thompson, DO [facility]   LOV 1/12/2023         Requested Prescriptions     Pending Prescriptions Disp Refills    naproxen (NAPROSYN) 500 MG tablet 60 tablet 0     Sig: Take 1 tablet by mouth 2 times daily (with meals)

## 2024-05-26 DIAGNOSIS — I10 BENIGN ESSENTIAL HTN: ICD-10-CM

## 2024-05-28 RX ORDER — METOPROLOL SUCCINATE 100 MG/1
100 TABLET, EXTENDED RELEASE ORAL DAILY
Qty: 90 TABLET | Refills: 3 | Status: SHIPPED | OUTPATIENT
Start: 2024-05-28

## 2024-07-15 ENCOUNTER — HOSPITAL ENCOUNTER (EMERGENCY)
Age: 64
Discharge: HOME | End: 2024-07-15
Attending: EMERGENCY MEDICINE
Payer: COMMERCIAL

## 2024-07-15 ENCOUNTER — APPOINTMENT (OUTPATIENT)
Dept: GENERAL RADIOLOGY | Age: 64
End: 2024-07-15
Payer: COMMERCIAL

## 2024-07-15 VITALS
WEIGHT: 228.4 LBS | BODY MASS INDEX: 36.71 KG/M2 | OXYGEN SATURATION: 96 % | TEMPERATURE: 98.2 F | HEART RATE: 73 BPM | SYSTOLIC BLOOD PRESSURE: 152 MMHG | HEIGHT: 66 IN | RESPIRATION RATE: 19 BRPM | DIASTOLIC BLOOD PRESSURE: 86 MMHG

## 2024-07-15 DIAGNOSIS — R00.2 PALPITATIONS: ICD-10-CM

## 2024-07-15 DIAGNOSIS — J34.89 SINUS PRESSURE: ICD-10-CM

## 2024-07-15 DIAGNOSIS — R60.0 BILATERAL LOWER EXTREMITY EDEMA: Primary | ICD-10-CM

## 2024-07-15 LAB
ALBUMIN SERPL-MCNC: 4 G/DL (ref 3.4–5)
ALBUMIN/GLOB SERPL: 1.1 {RATIO} (ref 1.1–2.2)
ALP SERPL-CCNC: 86 U/L (ref 40–129)
ALT SERPL-CCNC: 21 U/L (ref 10–40)
ANION GAP SERPL CALCULATED.3IONS-SCNC: 13 MMOL/L (ref 3–16)
AST SERPL-CCNC: 37 U/L (ref 15–37)
BACTERIA URNS QL MICRO: ABNORMAL /HPF
BASOPHILS # BLD: 0.1 K/UL (ref 0–0.2)
BASOPHILS NFR BLD: 0.6 %
BILIRUB SERPL-MCNC: 0.3 MG/DL (ref 0–1)
BILIRUB UR QL STRIP.AUTO: NEGATIVE
BUN SERPL-MCNC: 11 MG/DL (ref 7–20)
CALCIUM SERPL-MCNC: 9 MG/DL (ref 8.3–10.6)
CHLORIDE SERPL-SCNC: 105 MMOL/L (ref 99–110)
CLARITY UR: CLEAR
CO2 SERPL-SCNC: 18 MMOL/L (ref 21–32)
COLOR UR: YELLOW
CREAT SERPL-MCNC: 0.7 MG/DL (ref 0.6–1.2)
D-DIMER QUANTITATIVE: 0.36 UG/ML FEU (ref 0–0.6)
DEPRECATED RDW RBC AUTO: 14.3 % (ref 12.4–15.4)
EOSINOPHIL # BLD: 0.1 K/UL (ref 0–0.6)
EOSINOPHIL NFR BLD: 1.1 %
EPI CELLS #/AREA URNS HPF: ABNORMAL /HPF (ref 0–5)
GFR SERPLBLD CREATININE-BSD FMLA CKD-EPI: >90 ML/MIN/{1.73_M2}
GLUCOSE SERPL-MCNC: 94 MG/DL (ref 70–99)
GLUCOSE UR STRIP.AUTO-MCNC: NEGATIVE MG/DL
HCT VFR BLD AUTO: 41.5 % (ref 36–48)
HGB BLD-MCNC: 13.9 G/DL (ref 12–16)
HGB UR QL STRIP.AUTO: NEGATIVE
KETONES UR STRIP.AUTO-MCNC: NEGATIVE MG/DL
LEUKOCYTE ESTERASE UR QL STRIP.AUTO: ABNORMAL
LYMPHOCYTES # BLD: 2.2 K/UL (ref 1–5.1)
LYMPHOCYTES NFR BLD: 23.8 %
MCH RBC QN AUTO: 32.4 PG (ref 26–34)
MCHC RBC AUTO-ENTMCNC: 33.5 G/DL (ref 31–36)
MCV RBC AUTO: 96.7 FL (ref 80–100)
MONOCYTES # BLD: 0.5 K/UL (ref 0–1.3)
MONOCYTES NFR BLD: 6 %
NEUTROPHILS # BLD: 6.2 K/UL (ref 1.7–7.7)
NEUTROPHILS NFR BLD: 68.5 %
NITRITE UR QL STRIP.AUTO: NEGATIVE
NT-PROBNP SERPL-MCNC: 166 PG/ML (ref 0–124)
PH UR STRIP.AUTO: 6 [PH] (ref 5–8)
PLATELET # BLD AUTO: 232 K/UL (ref 135–450)
PMV BLD AUTO: 8.9 FL (ref 5–10.5)
POTASSIUM SERPL-SCNC: 5.1 MMOL/L (ref 3.5–5.1)
PROT SERPL-MCNC: 7.5 G/DL (ref 6.4–8.2)
PROT UR STRIP.AUTO-MCNC: NEGATIVE MG/DL
RBC # BLD AUTO: 4.29 M/UL (ref 4–5.2)
RBC #/AREA URNS HPF: ABNORMAL /HPF (ref 0–4)
RENAL EPI CELLS #/AREA UR COMP ASSIST: ABNORMAL /HPF (ref 0–1)
SODIUM SERPL-SCNC: 136 MMOL/L (ref 136–145)
SP GR UR STRIP.AUTO: 1.01 (ref 1–1.03)
TROPONIN, HIGH SENSITIVITY: <6 NG/L (ref 0–14)
UA COMPLETE W REFLEX CULTURE PNL UR: ABNORMAL
UA DIPSTICK W REFLEX MICRO PNL UR: YES
URN SPEC COLLECT METH UR: ABNORMAL
UROBILINOGEN UR STRIP-ACNC: 0.2 E.U./DL
WBC # BLD AUTO: 9.1 K/UL (ref 4–11)
WBC #/AREA URNS HPF: ABNORMAL /HPF (ref 0–5)

## 2024-07-15 PROCEDURE — 96374 THER/PROPH/DIAG INJ IV PUSH: CPT

## 2024-07-15 PROCEDURE — 36415 COLL VENOUS BLD VENIPUNCTURE: CPT

## 2024-07-15 PROCEDURE — 83880 ASSAY OF NATRIURETIC PEPTIDE: CPT

## 2024-07-15 PROCEDURE — 99285 EMERGENCY DEPT VISIT HI MDM: CPT

## 2024-07-15 PROCEDURE — 71046 X-RAY EXAM CHEST 2 VIEWS: CPT

## 2024-07-15 PROCEDURE — 80053 COMPREHEN METABOLIC PANEL: CPT

## 2024-07-15 PROCEDURE — 93005 ELECTROCARDIOGRAM TRACING: CPT

## 2024-07-15 PROCEDURE — 85025 COMPLETE CBC W/AUTO DIFF WBC: CPT

## 2024-07-15 PROCEDURE — 84484 ASSAY OF TROPONIN QUANT: CPT

## 2024-07-15 PROCEDURE — 85379 FIBRIN DEGRADATION QUANT: CPT

## 2024-07-15 PROCEDURE — 6360000002 HC RX W HCPCS: Performed by: EMERGENCY MEDICINE

## 2024-07-15 PROCEDURE — 81001 URINALYSIS AUTO W/SCOPE: CPT

## 2024-07-15 RX ORDER — KETOROLAC TROMETHAMINE 30 MG/ML
15 INJECTION, SOLUTION INTRAMUSCULAR; INTRAVENOUS ONCE
Status: COMPLETED | OUTPATIENT
Start: 2024-07-15 | End: 2024-07-15

## 2024-07-15 RX ADMIN — KETOROLAC TROMETHAMINE 15 MG: 30 INJECTION INTRAMUSCULAR; INTRAVENOUS at 18:44

## 2024-07-15 ASSESSMENT — PAIN - FUNCTIONAL ASSESSMENT: PAIN_FUNCTIONAL_ASSESSMENT: 0-10

## 2024-07-15 ASSESSMENT — PAIN DESCRIPTION - LOCATION: LOCATION: LEG

## 2024-07-15 ASSESSMENT — LIFESTYLE VARIABLES
HOW MANY STANDARD DRINKS CONTAINING ALCOHOL DO YOU HAVE ON A TYPICAL DAY: 5 OR 6
HOW OFTEN DO YOU HAVE A DRINK CONTAINING ALCOHOL: 2-4 TIMES A MONTH

## 2024-07-15 ASSESSMENT — PAIN SCALES - GENERAL: PAINLEVEL_OUTOF10: 5

## 2024-07-15 ASSESSMENT — PAIN DESCRIPTION - ORIENTATION: ORIENTATION: RIGHT;LEFT

## 2024-07-15 NOTE — ED PROVIDER NOTES
THE [facility]  EMERGENCY DEPARTMENT ENCOUNTER          ATTENDING PHYSICIAN NOTE       Date of evaluation: 7/15/2024    Chief Complaint     Leg Swelling (Patient presents to ED with report of bilateral lower extremity swelling, \"fluttering\" in chest. Reports she was seen at her doctor for this on Friday, was told if she did not have improvement in her symptoms by to day to be seen in the ED. Denies shortness of breath. Reports history of atrial fibrillation.)      History of Present Illness     Mildred Francisco is a 64 y.o. female who presents with concern of lower extremity swelling.  She reports that she has noticed some swelling in the top part of her medial bilateral lower extremities distal to the knee.  Reports that she has had some swelling like this in the past, at that point had an ultrasound but was told she did not have a blood clot that she needed to worry about, but does not know exactly what that means in terms of anatomical position.  Says that she has had this swelling for several days.  It appears to be localized to several distinct areas, not diffuse.  No shortness of breath or chest pain.  Occasionally does feel a fluttering sensation in her chest that last for 10 to 15 minutes at a time, goes away.  She is able to move around without shortness of breath.  She is not having the palpitating feeling currently.  She reports she does have a history of A-fib.  Denies GI symptoms.  Says that for the last 5 days or so she has had a headache as well, frontal in nature, not improved with the aspirin that she is taken for it.  She did take a COVID test at home she reports and it was negative.    ASSESSMENT / PLAN  (MEDICAL DECISION MAKING)     INITIAL VITALS: BP: (!) 205/108, Temp: 98.2 °F (36.8 °C), Pulse: (!) 101, Respirations: 18, SpO2: 95 %      Mildred Francisco is a 64 y.o. female who presents with bilateral leg swelling, occasional feeling of palpitation, and headache and sinus pressure.  The

## 2024-07-16 ENCOUNTER — OFFICE VISIT (OUTPATIENT)
Dept: INTERNAL MEDICINE CLINIC | Age: 64
End: 2024-07-16
Payer: COMMERCIAL

## 2024-07-16 VITALS
WEIGHT: 227 LBS | OXYGEN SATURATION: 98 % | DIASTOLIC BLOOD PRESSURE: 92 MMHG | SYSTOLIC BLOOD PRESSURE: 156 MMHG | BODY MASS INDEX: 36.64 KG/M2 | HEART RATE: 71 BPM

## 2024-07-16 DIAGNOSIS — I47.10 PSVT (PAROXYSMAL SUPRAVENTRICULAR TACHYCARDIA) (HCC): Primary | ICD-10-CM

## 2024-07-16 DIAGNOSIS — E78.5 HYPERLIPIDEMIA, UNSPECIFIED HYPERLIPIDEMIA TYPE: ICD-10-CM

## 2024-07-16 DIAGNOSIS — I10 BENIGN ESSENTIAL HTN: ICD-10-CM

## 2024-07-16 DIAGNOSIS — I87.2 VENOUS INSUFFICIENCY OF BOTH LOWER EXTREMITIES: ICD-10-CM

## 2024-07-16 DIAGNOSIS — R06.83 SNORING: ICD-10-CM

## 2024-07-16 DIAGNOSIS — I87.2 VENOUS REFLUX: ICD-10-CM

## 2024-07-16 DIAGNOSIS — R73.01 IMPAIRED FASTING GLUCOSE: ICD-10-CM

## 2024-07-16 LAB
EKG ATRIAL RATE: 75 BPM
EKG DIAGNOSIS: NORMAL
EKG P AXIS: 55 DEGREES
EKG P-R INTERVAL: 174 MS
EKG Q-T INTERVAL: 418 MS
EKG QRS DURATION: 90 MS
EKG QTC CALCULATION (BAZETT): 466 MS
EKG R AXIS: 4 DEGREES
EKG T AXIS: 28 DEGREES
EKG VENTRICULAR RATE: 75 BPM

## 2024-07-16 PROCEDURE — 3077F SYST BP >= 140 MM HG: CPT | Performed by: INTERNAL MEDICINE

## 2024-07-16 PROCEDURE — 3080F DIAST BP >= 90 MM HG: CPT | Performed by: INTERNAL MEDICINE

## 2024-07-16 PROCEDURE — 99215 OFFICE O/P EST HI 40 MIN: CPT | Performed by: INTERNAL MEDICINE

## 2024-07-16 RX ORDER — VALSARTAN 80 MG/1
80 TABLET ORAL DAILY
Qty: 90 TABLET | Refills: 1 | Status: SHIPPED | OUTPATIENT
Start: 2024-07-16

## 2024-07-16 SDOH — ECONOMIC STABILITY: HOUSING INSECURITY
IN THE LAST 12 MONTHS, WAS THERE A TIME WHEN YOU DID NOT HAVE A STEADY PLACE TO SLEEP OR SLEPT IN A SHELTER (INCLUDING NOW)?: NO

## 2024-07-16 SDOH — ECONOMIC STABILITY: FOOD INSECURITY: WITHIN THE PAST 12 MONTHS, YOU WORRIED THAT YOUR FOOD WOULD RUN OUT BEFORE YOU GOT MONEY TO BUY MORE.: NEVER TRUE

## 2024-07-16 SDOH — ECONOMIC STABILITY: FOOD INSECURITY: WITHIN THE PAST 12 MONTHS, THE FOOD YOU BOUGHT JUST DIDN'T LAST AND YOU DIDN'T HAVE MONEY TO GET MORE.: NEVER TRUE

## 2024-07-16 SDOH — ECONOMIC STABILITY: INCOME INSECURITY: HOW HARD IS IT FOR YOU TO PAY FOR THE VERY BASICS LIKE FOOD, HOUSING, MEDICAL CARE, AND HEATING?: NOT HARD AT ALL

## 2024-07-16 ASSESSMENT — PATIENT HEALTH QUESTIONNAIRE - PHQ9
SUM OF ALL RESPONSES TO PHQ9 QUESTIONS 1 & 2: 0
SUM OF ALL RESPONSES TO PHQ QUESTIONS 1-9: 0
SUM OF ALL RESPONSES TO PHQ QUESTIONS 1-9: 0
2. FEELING DOWN, DEPRESSED OR HOPELESS: NOT AT ALL
SUM OF ALL RESPONSES TO PHQ QUESTIONS 1-9: 0
SUM OF ALL RESPONSES TO PHQ QUESTIONS 1-9: 0
1. LITTLE INTEREST OR PLEASURE IN DOING THINGS: NOT AT ALL

## 2024-07-16 NOTE — PROGRESS NOTES
[facility] Physicians  Internal Medicine  Patient Encounter  Pavel Thompson D.O., [facility]        Chief Complaint   Patient presents with    Edema     Swelling in lower legs & achy. Feels fluttering in chest. Went to ER yesterday. Lab & EKG done at ER       HPI: 64 y.o. female seen urgently today with complaint of swelling in both lower legs.  Patient sent an email yesterday reporting that her legs \"look puffy.\"  Patient was concerned that she had blood clots.  She was also reporting that she was having fluttering in her chest and feared that she was having atrial fibrillation.  She was advised to go to the emergency department which she did yesterday.  The ER report is reviewed.  Upon her presentation her blood pressure was 205/108 with a pulse of 101.  There was no discussion about her blood pressure.  It was thought that she may be having episodes of atrial fibrillation but was not found to be in A-fib in the department.  Upon blood pressure recheck she was 152/86, pulse 73 she was found to have mild edema of both bilateral \"upper legs.\"  D-dimer was negative at 0.36  Her proBNP was 166  Troponin, high-sensitivity less than 6    Past Medical History:   Diagnosis Date    Acute superficial venous thrombosis of right lower extremity 01/10/2019    Asymptomatic bilateral carotid artery stenosis 6/5/2016    Colon polyp 12/2014    Diastolic dysfunction     Grade 1, Echo 6/2016    GERD (gastroesophageal reflux disease)     Hemorrhoid 9/24/2013    HLD (hyperlipidemia)     Hx of blood clots     Hypertension     Impaired fasting glucose 02/06/2019    Nephrolithiasis 3/2012    Osteoarthritis of left knee     Dr. Anival Shell    Retinal tear of left eye     Seasonal allergic rhinitis 5/5/2015    TIA (transient ischemic attack) 8/5/2011    Venous insufficiency          MEDICATIONS:  Prior to Visit Medications    Medication Sig   Multiple Vitamin (MULTIVITAMIN PO) Take by mouth daily   metoprolol succinate (TOPROL XL) 100 MG

## 2024-07-16 NOTE — DISCHARGE INSTRUCTIONS
Return to emergency department for worsening symptoms or other concerns.  Follow-up with your primary care doctor in 2 to 3 days for recheck, and return for any worsening problems in the meantime.

## 2024-07-16 NOTE — PATIENT INSTRUCTIONS
TO Do List:    #1  Wear your compression stockings daily and remove at bedtime    #2 elevate legs while at rest.    #3 continue your walking program    #4 follow a no added salt diet

## 2024-07-17 ENCOUNTER — TELEPHONE (OUTPATIENT)
Dept: VASCULAR SURGERY | Age: 64
End: 2024-07-17

## 2024-07-17 NOTE — TELEPHONE ENCOUNTER
Patient referred in by Dr. Pavel Thompson for I87.2 (ICD-10-CM) - Venous insufficiency of both lower extremities and I87.2 (ICD-10-CM) - Venous reflux.     Patient would like to schedule an appointment and referral is in [facility].     Patient can be reached at [phone].

## 2024-07-18 DIAGNOSIS — I87.2 VENOUS INSUFFICIENCY OF BOTH LOWER EXTREMITIES: Primary | ICD-10-CM

## 2024-07-24 DIAGNOSIS — I10 BENIGN ESSENTIAL HTN: Primary | ICD-10-CM

## 2024-07-24 RX ORDER — AMLODIPINE BESYLATE 5 MG/1
5 TABLET ORAL DAILY
Qty: 90 TABLET | Refills: 0 | Status: SHIPPED | OUTPATIENT
Start: 2024-07-24

## 2024-07-26 ENCOUNTER — NURSE ONLY (OUTPATIENT)
Dept: CARDIOLOGY CLINIC | Age: 64
End: 2024-07-26

## 2024-07-26 DIAGNOSIS — R00.2 PALPITATIONS: Primary | ICD-10-CM

## 2024-07-26 NOTE — PROGRESS NOTES
Monitor placed by AD  Monitor company [employer]  Length of monitor 14  Monitor ordered by PCP- Pavel Thompson  Serial number/Patch ID [id number]  Activation successful prior to pt leaving office? Yes

## 2024-08-07 ENCOUNTER — E-VISIT (OUTPATIENT)
Dept: INTERNAL MEDICINE CLINIC | Age: 64
End: 2024-08-07
Payer: COMMERCIAL

## 2024-08-07 DIAGNOSIS — J01.90 ACUTE SINUSITIS, RECURRENCE NOT SPECIFIED, UNSPECIFIED LOCATION: Primary | ICD-10-CM

## 2024-08-07 PROCEDURE — 99421 OL DIG E/M SVC 5-10 MIN: CPT | Performed by: INTERNAL MEDICINE

## 2024-08-07 RX ORDER — DOXYCYCLINE HYCLATE 100 MG
100 TABLET ORAL 2 TIMES DAILY
Qty: 20 TABLET | Refills: 0 | Status: SHIPPED | OUTPATIENT
Start: 2024-08-07 | End: 2024-08-17

## 2024-08-07 ASSESSMENT — LIFESTYLE VARIABLES
PACKS_PER_DAY: 10
SMOKING_YEARS: 30
SMOKING_STATUS: NO, I'M A FORMER SMOKER

## 2024-08-07 NOTE — PROGRESS NOTES
HPI: as per patient provided history  Exam: N/A (electronic visit)  ASSESSMENT/PLAN:  There are no diagnoses linked to this encounter.    Patient instructed to call the office if worsens, or fails to improve as anticipated.    5-10 minutes were spent on the digital evaluation and management of this patient.    Pavel Thompson, DO

## 2024-08-09 DIAGNOSIS — R73.01 IMPAIRED FASTING GLUCOSE: ICD-10-CM

## 2024-08-09 DIAGNOSIS — E78.5 HYPERLIPIDEMIA, UNSPECIFIED HYPERLIPIDEMIA TYPE: ICD-10-CM

## 2024-08-09 DIAGNOSIS — I10 BENIGN ESSENTIAL HTN: ICD-10-CM

## 2024-08-09 LAB
ANION GAP SERPL CALCULATED.3IONS-SCNC: 14 MMOL/L (ref 3–16)
BUN SERPL-MCNC: 13 MG/DL (ref 7–20)
CALCIUM SERPL-MCNC: 9.1 MG/DL (ref 8.3–10.6)
CHLORIDE SERPL-SCNC: 105 MMOL/L (ref 99–110)
CHOLEST SERPL-MCNC: 237 MG/DL (ref 0–199)
CO2 SERPL-SCNC: 23 MMOL/L (ref 21–32)
CREAT SERPL-MCNC: 0.7 MG/DL (ref 0.6–1.2)
EST. AVERAGE GLUCOSE BLD GHB EST-MCNC: 99.7 MG/DL
GFR SERPLBLD CREATININE-BSD FMLA CKD-EPI: >90 ML/MIN/{1.73_M2}
GLUCOSE SERPL-MCNC: 99 MG/DL (ref 70–99)
HBA1C MFR BLD: 5.1 %
HDLC SERPL-MCNC: 73 MG/DL (ref 40–60)
LDLC SERPL CALC-MCNC: 142 MG/DL
POTASSIUM SERPL-SCNC: 4.1 MMOL/L (ref 3.5–5.1)
SODIUM SERPL-SCNC: 142 MMOL/L (ref 136–145)
TRIGL SERPL-MCNC: 108 MG/DL (ref 0–150)
TSH SERPL DL<=0.005 MIU/L-ACNC: 2.05 UIU/ML (ref 0.27–4.2)
VLDLC SERPL CALC-MCNC: 22 MG/DL

## 2024-08-12 ENCOUNTER — OFFICE VISIT (OUTPATIENT)
Dept: INTERNAL MEDICINE CLINIC | Age: 64
End: 2024-08-12
Payer: COMMERCIAL

## 2024-08-12 VITALS
OXYGEN SATURATION: 97 % | DIASTOLIC BLOOD PRESSURE: 84 MMHG | BODY MASS INDEX: 36.8 KG/M2 | SYSTOLIC BLOOD PRESSURE: 118 MMHG | HEART RATE: 86 BPM | WEIGHT: 228 LBS

## 2024-08-12 DIAGNOSIS — Z00.00 ANNUAL PHYSICAL EXAM: Primary | ICD-10-CM

## 2024-08-12 DIAGNOSIS — Z12.11 SCREEN FOR COLON CANCER: ICD-10-CM

## 2024-08-12 DIAGNOSIS — I87.2 VENOUS REFLUX: ICD-10-CM

## 2024-08-12 DIAGNOSIS — Z80.0 FAMILY HISTORY OF COLON CANCER: ICD-10-CM

## 2024-08-12 DIAGNOSIS — E78.5 HYPERLIPIDEMIA, UNSPECIFIED HYPERLIPIDEMIA TYPE: ICD-10-CM

## 2024-08-12 DIAGNOSIS — Z82.62 FAMILY HISTORY OF OSTEOPOROSIS: ICD-10-CM

## 2024-08-12 DIAGNOSIS — R73.01 IMPAIRED FASTING GLUCOSE: ICD-10-CM

## 2024-08-12 DIAGNOSIS — Z12.31 ENCOUNTER FOR SCREENING MAMMOGRAM FOR MALIGNANT NEOPLASM OF BREAST: ICD-10-CM

## 2024-08-12 DIAGNOSIS — Z12.4 SCREENING FOR CERVICAL CANCER: ICD-10-CM

## 2024-08-12 DIAGNOSIS — Z78.0 POSTMENOPAUSAL: ICD-10-CM

## 2024-08-12 DIAGNOSIS — I47.10 PSVT (PAROXYSMAL SUPRAVENTRICULAR TACHYCARDIA) (HCC): ICD-10-CM

## 2024-08-12 DIAGNOSIS — I10 BENIGN ESSENTIAL HTN: ICD-10-CM

## 2024-08-12 DIAGNOSIS — Z23 NEED FOR TDAP VACCINATION: ICD-10-CM

## 2024-08-12 DIAGNOSIS — E66.01 CLASS 2 SEVERE OBESITY DUE TO EXCESS CALORIES WITH SERIOUS COMORBIDITY AND BODY MASS INDEX (BMI) OF 36.0 TO 36.9 IN ADULT (HCC): ICD-10-CM

## 2024-08-12 PROCEDURE — 99396 PREV VISIT EST AGE 40-64: CPT | Performed by: INTERNAL MEDICINE

## 2024-08-12 PROCEDURE — 3079F DIAST BP 80-89 MM HG: CPT | Performed by: INTERNAL MEDICINE

## 2024-08-12 PROCEDURE — 3074F SYST BP LT 130 MM HG: CPT | Performed by: INTERNAL MEDICINE

## 2024-08-12 PROCEDURE — 90471 IMMUNIZATION ADMIN: CPT | Performed by: INTERNAL MEDICINE

## 2024-08-12 PROCEDURE — 90715 TDAP VACCINE 7 YRS/> IM: CPT | Performed by: INTERNAL MEDICINE

## 2024-08-12 NOTE — PROGRESS NOTES
Tobacco Use    Smoking status: Former     Current packs/day: 0.00     Average packs/day: 0.5 packs/day for 35.0 years (17.5 ttl pk-yrs)     Types: Cigarettes     Start date: 1984     Quit date: 2019     Years since quittin.5    Smokeless tobacco: Never   Vaping Use    Vaping status: Never Used   Substance Use Topics    Alcohol use: Yes     Alcohol/week: 8.0 standard drinks of alcohol     Types: 8 Cans of beer per week     Comment: weekends    Drug use: No          Family History     Family History   Problem Relation Age of Onset    Colon Cancer Mother          at 57    High Blood Pressure Father     Anemia Father     Arthritis Father     Heart Disease Father     Osteoporosis Maternal Grandmother     Heart Attack Paternal Grandfather               REVIEW OF SYSTEMS:    CONSTITUTIONAL:  Neg   Recent weight changes, fatigue, fever, chills or night sweats, anorexia, Sleep difficulties  EYES: Neg  Blurry vision, loss of vision, double vision, tearing, itching, eye pain.  EARS:  Neg Hearing loss, tinnitus, vertigo, discharge or otalgia.  NOSE:  Neg  Rhinorrhea, sneezing, itching, allergy, epistaxis, or snoring  MOUTH/THROAT:  Neg Bleeding gums, hoarseness, sore throat, dysphagia, throat infections, or dentures  RESPIRATORY:  Neg SOB ,wheeze, cough, sputum, hemoptysis. No report of + TB test.  Sleep specialist consult pending.   CARDIOVASCULAR:  Neg Chest pain, palpitations, heart murmur, dyspnea on exertion, orthopnea, paroxysmal nocturnal dyspnea, or edema of extremities, or claudication.  + H/O Arrhythmia, Monitor pending.  She will see Dr. Pantoja .  Heavy, achy legs.    H/O Superficial thrombophlebitis, Legs ache, occasional swelling.    GASTROINTESTINAL:  Neg   Nausea, vomiting, or diarrhea, constipation, hematemesis, heart burn, dysphagia,change in bowel movements or stool caliber, hematochezia, melena, abdominal pain, or food intolerance. Colonoscopy, 2014: Yes, 5 year recall.

## 2024-08-12 NOTE — PATIENT INSTRUCTIONS
Preventive plan of care for Mildred Francisco        8/12/2024           Preventive Measures Status       Recommendations for screening   Colon Cancer Screen   Last colonoscopy: 12/10/2014 , Dr. Cabrales Test is due every 5 years due to family history of GI malignancy.  Colonoscopy is overdue.   Breast Cancer Screen  Last mammogram: 6/18/2019 Check yearly.  This is overdue   Cervical Cancer Screen   Last PAP smear: 12/2017 Repeat yearly or as otherwise advised by your gynecologist.    Osteoporosis Screen   Last DXA scan: None Due at age 65   Diabetes Screen  Glucose (mg/dL)   Date Value   08/09/2024 99    Test recommended and ordered   Cholesterol Screen  Lab Results   Component Value Date    CHOL 237 (H) 08/09/2024    TRIG 108 08/09/2024    HDL 73 (H) 08/09/2024    Test recommended and ordered   Hepatitis C screening recommended for those born between 1945 and 1965--2/4/2019   No need to repeat at this time   HIV screening recommended for those ages 15-65 NO MATTER THE RISK FOR HIV--2/4/2019 No need to repeat at this time   Aspirin for Cardiovascular Prevention   Yes Continue daily aspirin   Weight: Body mass index is 36.8 kg/m².   103.4 kg (228 lb)    Your BMI is 25 or greater, which indicates that you are overweight    Recommended Immunizations    Immunization History   Administered Date(s) Administered    COVID-19, PFIZER PURPLE top, DILUTE for use, (age 12 y+), 30mcg/0.3mL 03/22/2021, 04/12/2021    Influenza, FLUARIX, FLULAVAL, FLUZONE (age 6 mo+) AND AFLURIA, (age 3 y+), PF, 0.5mL 02/04/2019    Influenza, FLUCELVAX, (age 6 mo+), MDCK, PF, 0.5mL 10/11/2021    Pneumococcal, PPSV23, PNEUMOVAX 23, (age 2y+), SC/IM, 0.5mL 04/09/2014    TDaP, ADACEL (age 10y-64y), BOOSTRIX (age 10y+), IM, 0.5mL 04/09/2014        Influenza vaccine:  recommended every fall    Pneumonia vaccine: Prevnar Due at age 65    Tetanus vaccine:  tetanus and diptheria/Pertussis vaccine (Td/Tdap) recommended every 10 years-due today    Shingles

## 2024-08-21 PROBLEM — E66.09 CLASS 2 OBESITY DUE TO EXCESS CALORIES WITHOUT SERIOUS COMORBIDITY WITH BODY MASS INDEX (BMI) OF 37.0 TO 37.9 IN ADULT: Chronic | Status: ACTIVE | Noted: 2019-02-04

## 2024-08-21 PROBLEM — E66.812 CLASS 2 OBESITY DUE TO EXCESS CALORIES WITHOUT SERIOUS COMORBIDITY WITH BODY MASS INDEX (BMI) OF 37.0 TO 37.9 IN ADULT: Chronic | Status: ACTIVE | Noted: 2019-02-04

## 2024-08-21 PROBLEM — E66.01 CLASS 2 SEVERE OBESITY DUE TO EXCESS CALORIES WITH SERIOUS COMORBIDITY AND BODY MASS INDEX (BMI) OF 37.0 TO 37.9 IN ADULT (HCC): Chronic | Status: ACTIVE | Noted: 2019-02-04

## 2024-08-23 ENCOUNTER — TELEPHONE (OUTPATIENT)
Dept: SLEEP CENTER | Age: 64
End: 2024-08-23

## 2024-08-23 NOTE — TELEPHONE ENCOUNTER
Called to schedule a hst per Nic kennedy for the pt to return my call     [facility] insurance

## 2024-08-27 ENCOUNTER — OFFICE VISIT (OUTPATIENT)
Dept: VASCULAR SURGERY | Age: 64
End: 2024-08-27
Payer: COMMERCIAL

## 2024-08-27 VITALS
SYSTOLIC BLOOD PRESSURE: 136 MMHG | HEIGHT: 66 IN | TEMPERATURE: 97 F | OXYGEN SATURATION: 97 % | DIASTOLIC BLOOD PRESSURE: 92 MMHG | WEIGHT: 230 LBS | HEART RATE: 80 BPM | BODY MASS INDEX: 36.96 KG/M2

## 2024-08-27 DIAGNOSIS — I83.813 VARICOSE VEINS OF LEG WITH PAIN, BILATERAL: Primary | ICD-10-CM

## 2024-08-27 PROBLEM — Z86.0100 HISTORY OF COLONIC POLYPS: Status: ACTIVE | Noted: 2024-08-27

## 2024-08-27 PROBLEM — Z86.010 HISTORY OF COLONIC POLYPS: Status: ACTIVE | Noted: 2024-08-27

## 2024-08-27 PROCEDURE — 99204 OFFICE O/P NEW MOD 45 MIN: CPT | Performed by: SURGERY

## 2024-08-27 PROCEDURE — 3075F SYST BP GE 130 - 139MM HG: CPT | Performed by: SURGERY

## 2024-08-27 PROCEDURE — 3080F DIAST BP >= 90 MM HG: CPT | Performed by: SURGERY

## 2024-08-27 NOTE — PROGRESS NOTES
[facility] Vascular Surgery  Mirtha Pantoja MD, FACS, [facility]  [phone]    OUTPATIENT CONSULTATION    Date of Consultation:  8/27/2024    PCP:  Pavel Thompson DO       Chief Complaint: Bilateral lower extremity varicose veins    History of Present Illness:   Mildred Francisco is a 64 y.o. female who presents today for evaluation of bilateral lower extremity varicose veins..    She had an episode of acute thrombophlebitis of the right medial calf in February 2019.  She has been very compliant with stockings since I last saw her 5 years ago.  However, she now has continued and worsening achiness and heaviness in the legs.  She is more active and walks quite a bit.  She has taken up golfing.  She has been compliant with compression.  Pain in the varicosities, particularly in the anterior lower legs is starting to become more noticeable and hindering to her daily activities.  Her goal is to continue to be as active as possible and that she would like intervention for the varicose veins.  She is currently working for [employer].   It seems that she has taken a great interest in her health overall.  In the past, she had reflexivity which was significant for bilateral lower extremity deep and superficial vein reflux.  No history of DVT.  She reports the right leg is now the worst and causes the most pain.  She is here to discuss options for management.    Past Medical History:  Past Medical History:   Diagnosis Date    Acute superficial venous thrombosis of right lower extremity 01/10/2019    Asymptomatic bilateral carotid artery stenosis 06/05/2016    Colon polyp 12/2014    Diastolic dysfunction     Grade 1, Echo 6/2016    GERD (gastroesophageal reflux disease)     Hemorrhoid 09/24/2013    HLD (hyperlipidemia)     Hx of blood clots     Hypertension     Impaired fasting glucose 02/06/2019    Nephrolithiasis 03/2012    Osteoarthritis of left knee     Dr. Anival Shell    Retinal tear of     Vaping status: Never Used   Substance and Sexual Activity    Alcohol use: Yes     Alcohol/week: 8.0 standard drinks of alcohol     Types: 8 Cans of beer per week     Comment: weekends    Drug use: No    Sexual activity: Yes     Partners: Male   Other Topics Concern    Not on file   Social History Narrative    Not on file     Social Determinants of Health     Financial Resource Strain: Low Risk  (7/16/2024)    Overall Financial Resource Strain (CARDIA)     Difficulty of Paying Living Expenses: Not hard at all   Food Insecurity: No Food Insecurity (7/16/2024)    Hunger Vital Sign     Worried About Running Out of Food in the Last Year: Never true     Ran Out of Food in the Last Year: Never true   Transportation Needs: Unknown (7/16/2024)    PRAPARE - Transportation     Lack of Transportation (Medical): Not on file     Lack of Transportation (Non-Medical): No   Physical Activity: Not on file   Stress: Not on file   Social Connections: Not on file   Intimate Partner Violence: Not on file   Housing Stability: Unknown (7/16/2024)    Housing Stability Vital Sign     Unable to Pay for Housing in the Last Year: Not on file     Number of Places Lived in the Last Year: Not on file     Unstable Housing in the Last Year: No       Review of Systems:  Pertinent positive and negative items are noted in the HPI.  A comprehensive review of all other symptoms is otherwise negative.      PhysicalExamination:    Vitals:    08/27/24 1326   BP: (!) 136/92   Site: Right Upper Arm   Position: Sitting   Pulse: 80   Temp: 97 °F (36.1 °C)   TempSrc: Infrared   SpO2: 97%   Weight: 104.3 kg (230 lb)   Height: 1.676 m (5' 6\")     Body mass index is 37.12 kg/m².  Physical Exam  Musculoskeletal:        Legs:       Comments: Bilateral lower extremities with trace nonpitting edema, improved from prior examinations.  Mild hemosiderin staining of the gaiter regions bilaterally.  No ulceration or dermatitis.  Moderate size varicosities along the right

## 2024-10-10 ENCOUNTER — PREP FOR PROCEDURE (OUTPATIENT)
Dept: VASCULAR SURGERY | Age: 64
End: 2024-10-10

## 2024-10-10 DIAGNOSIS — I83.12 VARICOSE VEINS OF LEFT LOWER EXTREMITY WITH INFLAMMATION: ICD-10-CM

## 2024-10-13 RX ORDER — SODIUM CHLORIDE 0.9 % (FLUSH) 0.9 %
5-40 SYRINGE (ML) INJECTION PRN
Status: CANCELLED | OUTPATIENT
Start: 2024-11-01

## 2024-10-13 RX ORDER — SODIUM CHLORIDE 0.9 % (FLUSH) 0.9 %
5-40 SYRINGE (ML) INJECTION EVERY 12 HOURS SCHEDULED
Status: CANCELLED | OUTPATIENT
Start: 2024-11-15

## 2024-10-13 RX ORDER — SODIUM CHLORIDE 0.9 % (FLUSH) 0.9 %
5-40 SYRINGE (ML) INJECTION PRN
Status: CANCELLED | OUTPATIENT
Start: 2024-11-15

## 2024-10-13 RX ORDER — SODIUM CHLORIDE 0.9 % (FLUSH) 0.9 %
5-40 SYRINGE (ML) INJECTION EVERY 12 HOURS SCHEDULED
Status: CANCELLED | OUTPATIENT
Start: 2024-11-01

## 2024-10-13 RX ORDER — SODIUM CHLORIDE 9 MG/ML
INJECTION, SOLUTION INTRAVENOUS PRN
Status: CANCELLED | OUTPATIENT
Start: 2024-11-01

## 2024-10-13 RX ORDER — SODIUM CHLORIDE 9 MG/ML
INJECTION, SOLUTION INTRAVENOUS PRN
Status: CANCELLED | OUTPATIENT
Start: 2024-11-15

## 2024-10-23 DIAGNOSIS — I10 BENIGN ESSENTIAL HTN: ICD-10-CM

## 2024-10-23 RX ORDER — AMLODIPINE BESYLATE 5 MG/1
5 TABLET ORAL DAILY
Qty: 90 TABLET | Refills: 3 | Status: SHIPPED | OUTPATIENT
Start: 2024-10-23

## 2024-10-28 ENCOUNTER — TELEPHONE (OUTPATIENT)
Dept: VASCULAR SURGERY | Age: 64
End: 2024-10-28

## 2024-10-28 ENCOUNTER — TELEPHONE (OUTPATIENT)
Dept: INTERNAL MEDICINE CLINIC | Age: 64
End: 2024-10-28

## 2024-10-28 NOTE — TELEPHONE ENCOUNTER
No availability for patient to have pre op done before her leg surgery on 11.1.24, are we able to accommodate patient?     Please advise.

## 2024-10-28 NOTE — TELEPHONE ENCOUNTER
RIGHT GREAT SAPHENOUS VEIN RADIOFREQUENCY ABLATION WITH MULTIPLE STAB PHLEBECTOMIES [[id number]]  is scheduled to be completed on 11/01/2024.    Patient would like to confirm the time of the procedure and time of arrival, does she need to complete a pre-op physical, and if it is okay to take her blood pressure medications the day of the procedure.     Patient can be reached at [phone].

## 2024-10-28 NOTE — TELEPHONE ENCOUNTER
Patient Requested Dates(s): October 29 or 31, 2024   Patient Requested Time:   Provider Name: Pavel Thompson DO     Reason for Appointment Request: Established Patient - Available appointments did not meet patient need   Patient called in to schedule her Pre-op Visit having a surgery on Norvember 1, 2024/leg surgery.

## 2024-10-31 ENCOUNTER — TELEPHONE (OUTPATIENT)
Dept: INTERNAL MEDICINE CLINIC | Age: 64
End: 2024-10-31

## 2024-10-31 ENCOUNTER — TELEPHONE (OUTPATIENT)
Dept: VASCULAR SURGERY | Age: 64
End: 2024-10-31

## 2024-10-31 ENCOUNTER — OFFICE VISIT (OUTPATIENT)
Dept: INTERNAL MEDICINE CLINIC | Age: 64
End: 2024-10-31
Payer: COMMERCIAL

## 2024-10-31 VITALS
DIASTOLIC BLOOD PRESSURE: 76 MMHG | OXYGEN SATURATION: 97 % | TEMPERATURE: 98.5 F | HEART RATE: 92 BPM | BODY MASS INDEX: 38.25 KG/M2 | WEIGHT: 237 LBS | SYSTOLIC BLOOD PRESSURE: 128 MMHG

## 2024-10-31 DIAGNOSIS — J06.9 URI WITH COUGH AND CONGESTION: Primary | ICD-10-CM

## 2024-10-31 PROCEDURE — 3077F SYST BP >= 140 MM HG: CPT | Performed by: INTERNAL MEDICINE

## 2024-10-31 PROCEDURE — 99213 OFFICE O/P EST LOW 20 MIN: CPT | Performed by: INTERNAL MEDICINE

## 2024-10-31 PROCEDURE — 3079F DIAST BP 80-89 MM HG: CPT | Performed by: INTERNAL MEDICINE

## 2024-10-31 RX ORDER — DOXYCYCLINE HYCLATE 100 MG
100 TABLET ORAL 2 TIMES DAILY
Qty: 20 TABLET | Refills: 0 | Status: SHIPPED | OUTPATIENT
Start: 2024-10-31 | End: 2024-11-10

## 2024-10-31 RX ORDER — GUAIFENESIN AND DEXTROMETHORPHAN HYDROBROMIDE 600; 30 MG/1; MG/1
1 TABLET, EXTENDED RELEASE ORAL 2 TIMES DAILY
COMMUNITY
Start: 2024-10-31

## 2024-10-31 RX ORDER — FLUTICASONE PROPIONATE 50 MCG
2 SPRAY, SUSPENSION (ML) NASAL DAILY
Qty: 1 EACH | Refills: 2 | Status: SHIPPED | OUTPATIENT
Start: 2024-10-31

## 2024-10-31 NOTE — TELEPHONE ENCOUNTER
Bessie from Dr. treviño's office is calling in returning julies call. Per bessie she will call patient to cancel her surgery due to no clearance form PCP

## 2024-10-31 NOTE — PROGRESS NOTES
[facility]  Internal Medicine  Patient Encounter  Pavel Thompson D.O., [facility]        Chief Complaint   Patient presents with    Pharyngitis     Patient presents for acute OV.   Sore throat worsening x 5 days.    Headache     X 5 days \"feels like Covid HA\"    Cough    Ear Pain     (B)       HPI: 64 y.o. female who was originally scheduled to have preoperative history and physical indicated that she was not feeling well.  Patient was scheduled to have endovenous radiofrequency ablation of the right leg tomorrow and then will undergo the same procedure on the left.  Patient states that about 5 days ago (Saturday) she developed runny nose, nasal congestion, headache, scratchy and sore throat, postnasal drainage.  She also felt flushed.  She did not take a temperature.  She did check a COVID test this past Monday which was negative.  She does admit to sneezing, itchy and watery eyes.  She has not been using her Flonase.  She states the nasal discharge was initially clear and whitish in color.  She states now the nasal discharge can be green.  The cough has been intermittently productive with a milky colored sputum.  She thinks she is coughing up what is draining down the back of her throat    Past Medical History:   Diagnosis Date    Acute superficial venous thrombosis of right lower extremity 01/10/2019    Asymptomatic bilateral carotid artery stenosis 06/05/2016    Colon polyp 12/2014    Diastolic dysfunction     Grade 1, Echo 6/2016    GERD (gastroesophageal reflux disease)     Hemorrhoid 09/24/2013    HLD (hyperlipidemia)     Hx of blood clots     Hypertension     Impaired fasting glucose 02/06/2019    Nephrolithiasis 03/2012    Osteoarthritis of left knee     Dr. Anival Shell    Retinal tear of left eye     Seasonal allergic rhinitis 05/05/2015    Sleep apnea     SVT (supraventricular tachycardia) (HCC) 08/10/2021    TIA (transient ischemic attack) 08/05/2011    Venous insufficiency

## 2024-10-31 NOTE — TELEPHONE ENCOUNTER
JAD Bazzi with Dr. Thompson office that I received message and will cancel Ms. Francisco surgery for tomorrow. Dr. Pantoja is aware of of cancellization and message was sent to surgery scheduling.

## 2024-10-31 NOTE — TELEPHONE ENCOUNTER
Rose Mary with Dr. Pavel Thompson's Office states the patient was in the office today for her Pre-Op Clearance and is sick. She will not be cleared for surgery Dr. Thompson states he will be cancelling the surgery for 11/01/2024. They are also completing a respiratory panel on the patient.     Rose Mary with Dr. Pavel Thompson's Office can be reached at [phone].

## 2024-10-31 NOTE — PATIENT INSTRUCTIONS
To Do List:    #1 okay to use Coricidin HBP for the nasal congestion and runny nose.  You can also add plain Zyrtec 10 mg nightly.    #2 recommend Mucinex DM for congestion and cough    #3 use your Flonase-2 sprays each nostril once daily    #4 stay well-hydrated

## 2024-11-01 LAB
REPORT: NORMAL
RESP PATH DNA+RNA PNL NPH NAA+NON-PROBE: NORMAL

## 2024-11-01 NOTE — TELEPHONE ENCOUNTER
Spoke to patient in regards to canceled surgery ,per patient the only s/p cancelled was the one on 11.1.24 and will still be keeping the surgery scheduled for 11.15.24. Per patient still needs pre op on 11.08.24.

## 2024-11-08 ENCOUNTER — OFFICE VISIT (OUTPATIENT)
Dept: INTERNAL MEDICINE CLINIC | Age: 64
End: 2024-11-08
Payer: COMMERCIAL

## 2024-11-08 VITALS
WEIGHT: 237 LBS | SYSTOLIC BLOOD PRESSURE: 118 MMHG | TEMPERATURE: 98.1 F | HEART RATE: 83 BPM | OXYGEN SATURATION: 98 % | DIASTOLIC BLOOD PRESSURE: 76 MMHG | BODY MASS INDEX: 38.25 KG/M2

## 2024-11-08 DIAGNOSIS — I47.10 PSVT (PAROXYSMAL SUPRAVENTRICULAR TACHYCARDIA) (HCC): ICD-10-CM

## 2024-11-08 DIAGNOSIS — E78.5 HYPERLIPIDEMIA, UNSPECIFIED HYPERLIPIDEMIA TYPE: ICD-10-CM

## 2024-11-08 DIAGNOSIS — Z01.818 PREOP EXAM FOR INTERNAL MEDICINE: Primary | ICD-10-CM

## 2024-11-08 DIAGNOSIS — I87.2 VENOUS INSUFFICIENCY OF BOTH LOWER EXTREMITIES: ICD-10-CM

## 2024-11-08 DIAGNOSIS — I83.813 VARICOSE VEINS OF BILATERAL LOWER EXTREMITIES WITH PAIN: ICD-10-CM

## 2024-11-08 DIAGNOSIS — I87.2 VENOUS REFLUX: ICD-10-CM

## 2024-11-08 DIAGNOSIS — R73.01 IMPAIRED FASTING GLUCOSE: ICD-10-CM

## 2024-11-08 PROCEDURE — 99214 OFFICE O/P EST MOD 30 MIN: CPT | Performed by: INTERNAL MEDICINE

## 2024-11-08 PROCEDURE — 3074F SYST BP LT 130 MM HG: CPT | Performed by: INTERNAL MEDICINE

## 2024-11-08 PROCEDURE — 3078F DIAST BP <80 MM HG: CPT | Performed by: INTERNAL MEDICINE

## 2024-11-08 NOTE — PATIENT INSTRUCTIONS
Stop Aspirin 1 week prior to surgery    Avoid NSAID's (Motrin, Aleve, Advil, Ibuprofen),  vitamin, hervbal supplements and fish oil 1 week prior to procedure

## 2024-11-08 NOTE — PROGRESS NOTES
basketball and tennis without difficulty.      According to the 2014 ACC/AHA pre-operative risk assessment guidelines Mildred Francisco is a low risk for major cardiac complications during a low risk procedure and may continue as planned. Specific medication recommendations are listed below. Medications recommended to continue should be taken with a sip of water even when NPO.     Further recommendations from consultants: None    Medication Recommendations:  Betablocker should be continued the day of surgery   Calcium Channel Blocker should be continued the day of surgery   ASA should be HELD 7 days prior to surgery and restarted with the postoperative diet          Encounter Diagnoses   Name Primary?    Preop exam for internal medicine Yes    Varicose veins of bilateral lower extremities with pain     Venous reflux     Venous insufficiency of both lower extremities     Impaired fasting glucose     PSVT (paroxysmal supraventricular tachycardia) (HCC)--asymptomatic without signs of recurrence.     Hyperlipidemia, unspecified hyperlipidemia type          Plan:  Acceptable risk for the planned procedure.  No contraindications at this time    EKG-- Not needed for this procedure  Lab-- Not needed for this procedure.      Pt will stop ASA 1 week prior to surgery  Pt will avoid NSAID's, OTC vitamin supplements and fish oil 1 week prior to procedure      OK to take BP medication morning of procedure.          On this date 11/8/2024 I have spent 32 minutes reviewing previous notes, test results and face to face with the patient discussing the diagnosis and importance of compliance with the treatment plan as well as documenting on the day of the visit.         Electronically Signed:  Pavel Thompson D.O

## 2024-11-11 NOTE — PROGRESS NOTES
11/11/2024 1234 PM:        [facility] PRE-SURGICAL TESTING INSTRUCTIONS                      PRIOR TO PROCEDURE DATE:    1. PLEASE FOLLOW ANY INSTRUCTIONS GIVEN TO YOU PER YOUR SURGEON.      2. Arrange for someone to drive you home and be with you for the first 24 hours after discharge for your safety after your procedure for which you received sedation. Ensure it is someone we can share information with regarding your discharge.     NOTE: At this time ONLY 2 ADULTS may accompany you. NO CHILDREN UNDER AGE OF 16.    One person ENCOURAGED to stay at hospital entire time if outpatient surgery      3. You must contact your surgeon for instructions IF:  You are taking any blood thinners, aspirin, anti-inflammatory or vitamins.   Contact your ordering physician/surgeon for prescription medication instructions as soon as possible, especially if taking blood thinners, aspirin, heart, or diabetic medication.   There is a change in your physical condition such as a cold, fever, rash, cuts, sores, or any other infection, especially near your surgical site.    4. Do not drink alcohol the day before or day of your procedure.  Do not use any recreational marijuana at least 24 hours or street drugs (heroin, cocaine) at minimum 5 days prior to your procedure.     5. A Pre-Surgical History and Physical MUST be completed WITHIN 30 DAYS OR LESS prior to your procedure.by your Physician or an Urgent Care        THE DAY OF YOUR PROCEDURE:  1.  Follow instructions for ARRIVAL TIME as DIRECTED BY YOUR SURGEON. [place] [phone]     2. Enter the MAIN entrance from [facility] and follow the signs to the free Parking Garage or  Parking (offered free of charge 7 am-5pm).      3. Enter the Main Entrance of the hospital (do not enter from the lower level of the parking garage). Upon entrance, check in with the  at the surgical information desk on your LEFT.   Bring your

## 2024-11-11 NOTE — PROGRESS NOTES
11/11/2024 TI COMPLETED    EPIC 7/15/2024/TS  STRESS TEST 7/11/2016 REFERENCED IN H&P/TS   LABS FROM  8/9/2024 REFERENCED IN H&P/TS    H/P IN EPIC NOTES 11/8/2024/TS

## 2024-11-14 ENCOUNTER — TELEPHONE (OUTPATIENT)
Dept: VASCULAR SURGERY | Age: 64
End: 2024-11-14

## 2024-11-14 NOTE — TELEPHONE ENCOUNTER
Spoke with Ms. Celsopoli, a couple of dates were offered and she will discuss with family and call me back.

## 2024-11-15 ENCOUNTER — ANESTHESIA EVENT (OUTPATIENT)
Dept: OPERATING ROOM | Age: 64
End: 2024-11-15
Payer: COMMERCIAL

## 2024-11-15 ENCOUNTER — ANESTHESIA (OUTPATIENT)
Dept: OPERATING ROOM | Age: 64
End: 2024-11-15
Payer: COMMERCIAL

## 2024-11-15 ENCOUNTER — HOSPITAL ENCOUNTER (OUTPATIENT)
Dept: VASCULAR LAB | Age: 64
Setting detail: OUTPATIENT SURGERY
Discharge: HOME | End: 2024-11-17
Attending: SURGERY
Payer: COMMERCIAL

## 2024-11-15 ENCOUNTER — HOSPITAL ENCOUNTER (OUTPATIENT)
Age: 64
Setting detail: OUTPATIENT SURGERY
Discharge: HOME | End: 2024-11-15
Attending: SURGERY | Admitting: SURGERY
Payer: COMMERCIAL

## 2024-11-15 VITALS
BODY MASS INDEX: 38.96 KG/M2 | RESPIRATION RATE: 18 BRPM | OXYGEN SATURATION: 93 % | TEMPERATURE: 98.4 F | HEART RATE: 72 BPM | DIASTOLIC BLOOD PRESSURE: 76 MMHG | HEIGHT: 66 IN | SYSTOLIC BLOOD PRESSURE: 107 MMHG | WEIGHT: 242.4 LBS

## 2024-11-15 DIAGNOSIS — I83.12 VARICOSE VEINS OF LEFT LOWER EXTREMITY WITH INFLAMMATION: Primary | ICD-10-CM

## 2024-11-15 LAB
ANION GAP SERPL CALCULATED.3IONS-SCNC: 12 MMOL/L (ref 3–16)
BUN SERPL-MCNC: 9 MG/DL (ref 7–20)
CALCIUM SERPL-MCNC: 9 MG/DL (ref 8.3–10.6)
CHLORIDE SERPL-SCNC: 106 MMOL/L (ref 99–110)
CO2 SERPL-SCNC: 23 MMOL/L (ref 21–32)
CREAT SERPL-MCNC: 0.7 MG/DL (ref 0.6–1.2)
DEPRECATED RDW RBC AUTO: 14 % (ref 12.4–15.4)
ECHO BSA: 2.26 M2
EKG ATRIAL RATE: 76 BPM
EKG DIAGNOSIS: NORMAL
EKG P AXIS: 34 DEGREES
EKG P-R INTERVAL: 168 MS
EKG Q-T INTERVAL: 416 MS
EKG QRS DURATION: 94 MS
EKG QTC CALCULATION (BAZETT): 468 MS
EKG R AXIS: 26 DEGREES
EKG T AXIS: 28 DEGREES
EKG VENTRICULAR RATE: 76 BPM
GFR SERPLBLD CREATININE-BSD FMLA CKD-EPI: >90 ML/MIN/{1.73_M2}
GLUCOSE SERPL-MCNC: 99 MG/DL (ref 70–99)
HCT VFR BLD AUTO: 39.2 % (ref 36–48)
HGB BLD-MCNC: 13.2 G/DL (ref 12–16)
INR PPP: 0.98 (ref 0.85–1.15)
MCH RBC QN AUTO: 32.1 PG (ref 26–34)
MCHC RBC AUTO-ENTMCNC: 33.5 G/DL (ref 31–36)
MCV RBC AUTO: 95.7 FL (ref 80–100)
PLATELET # BLD AUTO: 195 K/UL (ref 135–450)
PMV BLD AUTO: 8.6 FL (ref 5–10.5)
POTASSIUM SERPL-SCNC: 4.2 MMOL/L (ref 3.5–5.1)
PROTHROMBIN TIME: 13.2 SEC (ref 11.9–14.9)
RBC # BLD AUTO: 4.1 M/UL (ref 4–5.2)
SODIUM SERPL-SCNC: 141 MMOL/L (ref 136–145)
WBC # BLD AUTO: 7.3 K/UL (ref 4–11)

## 2024-11-15 PROCEDURE — 85027 COMPLETE CBC AUTOMATED: CPT

## 2024-11-15 PROCEDURE — 2580000003 HC RX 258: Performed by: SURGERY

## 2024-11-15 PROCEDURE — 3700000000 HC ANESTHESIA ATTENDED CARE: Performed by: SURGERY

## 2024-11-15 PROCEDURE — 2500000003 HC RX 250 WO HCPCS: Performed by: STUDENT IN AN ORGANIZED HEALTH CARE EDUCATION/TRAINING PROGRAM

## 2024-11-15 PROCEDURE — 85610 PROTHROMBIN TIME: CPT

## 2024-11-15 PROCEDURE — 93010 ELECTROCARDIOGRAM REPORT: CPT | Performed by: INTERNAL MEDICINE

## 2024-11-15 PROCEDURE — C1888 ENDOVAS NON-CARDIAC ABL CATH: HCPCS | Performed by: SURGERY

## 2024-11-15 PROCEDURE — 3600000014 HC SURGERY LEVEL 4 ADDTL 15MIN: Performed by: SURGERY

## 2024-11-15 PROCEDURE — 6360000002 HC RX W HCPCS: Performed by: SURGERY

## 2024-11-15 PROCEDURE — 2709999900 HC NON-CHARGEABLE SUPPLY: Performed by: SURGERY

## 2024-11-15 PROCEDURE — 7100000011 HC PHASE II RECOVERY - ADDTL 15 MIN: Performed by: SURGERY

## 2024-11-15 PROCEDURE — 36476 ENDOVENOUS RF VEIN ADD-ON: CPT | Performed by: SURGERY

## 2024-11-15 PROCEDURE — 3600000004 HC SURGERY LEVEL 4 BASE: Performed by: SURGERY

## 2024-11-15 PROCEDURE — C1769 GUIDE WIRE: HCPCS | Performed by: SURGERY

## 2024-11-15 PROCEDURE — 37799 UNLISTED PX VASCULAR SURGERY: CPT | Performed by: SURGERY

## 2024-11-15 PROCEDURE — 7100000000 HC PACU RECOVERY - FIRST 15 MIN: Performed by: SURGERY

## 2024-11-15 PROCEDURE — 2580000003 HC RX 258: Performed by: ANESTHESIOLOGY

## 2024-11-15 PROCEDURE — C1894 INTRO/SHEATH, NON-LASER: HCPCS | Performed by: SURGERY

## 2024-11-15 PROCEDURE — 3700000001 HC ADD 15 MINUTES (ANESTHESIA): Performed by: SURGERY

## 2024-11-15 PROCEDURE — 6370000000 HC RX 637 (ALT 250 FOR IP): Performed by: ANESTHESIOLOGY

## 2024-11-15 PROCEDURE — 2720000010 HC SURG SUPPLY STERILE: Performed by: SURGERY

## 2024-11-15 PROCEDURE — 37765 STAB PHLEB VEINS XTR 10-20: CPT | Performed by: SURGERY

## 2024-11-15 PROCEDURE — 93005 ELECTROCARDIOGRAM TRACING: CPT | Performed by: SURGERY

## 2024-11-15 PROCEDURE — 7100000010 HC PHASE II RECOVERY - FIRST 15 MIN: Performed by: SURGERY

## 2024-11-15 PROCEDURE — 2500000003 HC RX 250 WO HCPCS: Performed by: SURGERY

## 2024-11-15 PROCEDURE — 7100000001 HC PACU RECOVERY - ADDTL 15 MIN: Performed by: SURGERY

## 2024-11-15 PROCEDURE — 80048 BASIC METABOLIC PNL TOTAL CA: CPT

## 2024-11-15 PROCEDURE — 36475 ENDOVENOUS RF 1ST VEIN: CPT | Performed by: SURGERY

## 2024-11-15 PROCEDURE — 6360000002 HC RX W HCPCS: Performed by: STUDENT IN AN ORGANIZED HEALTH CARE EDUCATION/TRAINING PROGRAM

## 2024-11-15 RX ORDER — MIDAZOLAM HYDROCHLORIDE 1 MG/ML
INJECTION, SOLUTION INTRAMUSCULAR; INTRAVENOUS
Status: DISCONTINUED | OUTPATIENT
Start: 2024-11-15 | End: 2024-11-15 | Stop reason: SDUPTHER

## 2024-11-15 RX ORDER — SODIUM CHLORIDE 0.9 % (FLUSH) 0.9 %
5-40 SYRINGE (ML) INJECTION EVERY 12 HOURS SCHEDULED
Status: DISCONTINUED | OUTPATIENT
Start: 2024-11-15 | End: 2024-11-15 | Stop reason: HOSPADM

## 2024-11-15 RX ORDER — SODIUM CHLORIDE 0.9 % (FLUSH) 0.9 %
5-40 SYRINGE (ML) INJECTION PRN
Status: DISCONTINUED | OUTPATIENT
Start: 2024-11-15 | End: 2024-11-15 | Stop reason: HOSPADM

## 2024-11-15 RX ORDER — PROCHLORPERAZINE EDISYLATE 5 MG/ML
5 INJECTION INTRAMUSCULAR; INTRAVENOUS
Status: DISCONTINUED | OUTPATIENT
Start: 2024-11-15 | End: 2024-11-15 | Stop reason: HOSPADM

## 2024-11-15 RX ORDER — GLYCOPYRROLATE 0.2 MG/ML
INJECTION INTRAMUSCULAR; INTRAVENOUS
Status: DISCONTINUED | OUTPATIENT
Start: 2024-11-15 | End: 2024-11-15 | Stop reason: SDUPTHER

## 2024-11-15 RX ORDER — FENTANYL CITRATE 50 UG/ML
50 INJECTION, SOLUTION INTRAMUSCULAR; INTRAVENOUS EVERY 5 MIN PRN
Status: DISCONTINUED | OUTPATIENT
Start: 2024-11-15 | End: 2024-11-15 | Stop reason: HOSPADM

## 2024-11-15 RX ORDER — SODIUM CHLORIDE 9 MG/ML
INJECTION, SOLUTION INTRAVENOUS PRN
Status: DISCONTINUED | OUTPATIENT
Start: 2024-11-15 | End: 2024-11-15 | Stop reason: HOSPADM

## 2024-11-15 RX ORDER — ROCURONIUM BROMIDE 10 MG/ML
INJECTION, SOLUTION INTRAVENOUS
Status: DISCONTINUED | OUTPATIENT
Start: 2024-11-15 | End: 2024-11-15 | Stop reason: SDUPTHER

## 2024-11-15 RX ORDER — OXYCODONE HYDROCHLORIDE 5 MG/1
10 TABLET ORAL PRN
Status: COMPLETED | OUTPATIENT
Start: 2024-11-15 | End: 2024-11-15

## 2024-11-15 RX ORDER — LIDOCAINE HYDROCHLORIDE 10 MG/ML
INJECTION, SOLUTION EPIDURAL; INFILTRATION; INTRACAUDAL; PERINEURAL PRN
Status: DISCONTINUED | OUTPATIENT
Start: 2024-11-15 | End: 2024-11-15 | Stop reason: HOSPADM

## 2024-11-15 RX ORDER — ONDANSETRON 2 MG/ML
4 INJECTION INTRAMUSCULAR; INTRAVENOUS
Status: DISCONTINUED | OUTPATIENT
Start: 2024-11-15 | End: 2024-11-15 | Stop reason: HOSPADM

## 2024-11-15 RX ORDER — PHENYLEPHRINE HYDROCHLORIDE 10 MG/ML
INJECTION INTRAVENOUS
Status: DISCONTINUED | OUTPATIENT
Start: 2024-11-15 | End: 2024-11-15 | Stop reason: SDUPTHER

## 2024-11-15 RX ORDER — ACETAMINOPHEN 500 MG
500 TABLET ORAL 4 TIMES DAILY PRN
Qty: 360 TABLET | Refills: 1 | Status: SHIPPED | OUTPATIENT
Start: 2024-11-15

## 2024-11-15 RX ORDER — FENTANYL CITRATE 50 UG/ML
INJECTION, SOLUTION INTRAMUSCULAR; INTRAVENOUS
Status: DISCONTINUED | OUTPATIENT
Start: 2024-11-15 | End: 2024-11-15 | Stop reason: SDUPTHER

## 2024-11-15 RX ORDER — SODIUM CHLORIDE, SODIUM LACTATE, POTASSIUM CHLORIDE, CALCIUM CHLORIDE 600; 310; 30; 20 MG/100ML; MG/100ML; MG/100ML; MG/100ML
INJECTION, SOLUTION INTRAVENOUS CONTINUOUS
Status: DISCONTINUED | OUTPATIENT
Start: 2024-11-15 | End: 2024-11-15 | Stop reason: HOSPADM

## 2024-11-15 RX ORDER — NALOXONE HYDROCHLORIDE 0.4 MG/ML
INJECTION, SOLUTION INTRAMUSCULAR; INTRAVENOUS; SUBCUTANEOUS PRN
Status: DISCONTINUED | OUTPATIENT
Start: 2024-11-15 | End: 2024-11-15 | Stop reason: HOSPADM

## 2024-11-15 RX ORDER — ONDANSETRON 2 MG/ML
INJECTION INTRAMUSCULAR; INTRAVENOUS
Status: DISCONTINUED | OUTPATIENT
Start: 2024-11-15 | End: 2024-11-15 | Stop reason: SDUPTHER

## 2024-11-15 RX ORDER — LIDOCAINE HYDROCHLORIDE 10 MG/ML
1 INJECTION, SOLUTION EPIDURAL; INFILTRATION; INTRACAUDAL; PERINEURAL
Status: DISCONTINUED | OUTPATIENT
Start: 2024-11-15 | End: 2024-11-15 | Stop reason: HOSPADM

## 2024-11-15 RX ORDER — HYDRALAZINE HYDROCHLORIDE 20 MG/ML
10 INJECTION INTRAMUSCULAR; INTRAVENOUS
Status: DISCONTINUED | OUTPATIENT
Start: 2024-11-15 | End: 2024-11-15 | Stop reason: HOSPADM

## 2024-11-15 RX ORDER — OXYCODONE HYDROCHLORIDE 5 MG/1
5 TABLET ORAL PRN
Status: COMPLETED | OUTPATIENT
Start: 2024-11-15 | End: 2024-11-15

## 2024-11-15 RX ORDER — LABETALOL HYDROCHLORIDE 5 MG/ML
10 INJECTION, SOLUTION INTRAVENOUS
Status: DISCONTINUED | OUTPATIENT
Start: 2024-11-15 | End: 2024-11-15 | Stop reason: HOSPADM

## 2024-11-15 RX ORDER — FENTANYL CITRATE 50 UG/ML
25 INJECTION, SOLUTION INTRAMUSCULAR; INTRAVENOUS EVERY 5 MIN PRN
Status: DISCONTINUED | OUTPATIENT
Start: 2024-11-15 | End: 2024-11-15 | Stop reason: HOSPADM

## 2024-11-15 RX ORDER — DEXAMETHASONE SODIUM PHOSPHATE 4 MG/ML
INJECTION, SOLUTION INTRA-ARTICULAR; INTRALESIONAL; INTRAMUSCULAR; INTRAVENOUS; SOFT TISSUE
Status: DISCONTINUED | OUTPATIENT
Start: 2024-11-15 | End: 2024-11-15 | Stop reason: SDUPTHER

## 2024-11-15 RX ORDER — PROPOFOL 10 MG/ML
INJECTION, EMULSION INTRAVENOUS
Status: DISCONTINUED | OUTPATIENT
Start: 2024-11-15 | End: 2024-11-15 | Stop reason: SDUPTHER

## 2024-11-15 RX ORDER — LIDOCAINE HYDROCHLORIDE 20 MG/ML
INJECTION, SOLUTION INTRAVENOUS
Status: DISCONTINUED | OUTPATIENT
Start: 2024-11-15 | End: 2024-11-15 | Stop reason: SDUPTHER

## 2024-11-15 RX ADMIN — MIDAZOLAM HYDROCHLORIDE 2 MG: 2 INJECTION, SOLUTION INTRAMUSCULAR; INTRAVENOUS at 07:39

## 2024-11-15 RX ADMIN — PHENYLEPHRINE HYDROCHLORIDE 100 MCG: 10 INJECTION, SOLUTION INTRAVENOUS at 08:43

## 2024-11-15 RX ADMIN — OXYCODONE 5 MG: 5 TABLET ORAL at 10:30

## 2024-11-15 RX ADMIN — PHENYLEPHRINE HYDROCHLORIDE 100 MCG: 10 INJECTION, SOLUTION INTRAVENOUS at 08:05

## 2024-11-15 RX ADMIN — PROPOFOL 120 MG: 10 INJECTION, EMULSION INTRAVENOUS at 07:42

## 2024-11-15 RX ADMIN — SUGAMMADEX 200 MG: 100 INJECTION, SOLUTION INTRAVENOUS at 08:55

## 2024-11-15 RX ADMIN — GLYCOPYRROLATE 0.2 MG: 0.2 INJECTION INTRAMUSCULAR; INTRAVENOUS at 08:45

## 2024-11-15 RX ADMIN — ROCURONIUM BROMIDE 100 MG: 10 INJECTION, SOLUTION INTRAVENOUS at 07:42

## 2024-11-15 RX ADMIN — PHENYLEPHRINE HYDROCHLORIDE 100 MCG: 10 INJECTION, SOLUTION INTRAVENOUS at 08:32

## 2024-11-15 RX ADMIN — LIDOCAINE HYDROCHLORIDE 100 MG: 20 INJECTION, SOLUTION INTRAVENOUS at 07:42

## 2024-11-15 RX ADMIN — FENTANYL CITRATE 50 MCG: 50 INJECTION, SOLUTION INTRAMUSCULAR; INTRAVENOUS at 08:55

## 2024-11-15 RX ADMIN — PHENYLEPHRINE HYDROCHLORIDE 100 MCG: 10 INJECTION, SOLUTION INTRAVENOUS at 08:21

## 2024-11-15 RX ADMIN — WATER 2000 MG: 1 INJECTION INTRAMUSCULAR; INTRAVENOUS; SUBCUTANEOUS at 07:45

## 2024-11-15 RX ADMIN — FENTANYL CITRATE 50 MCG: 50 INJECTION, SOLUTION INTRAMUSCULAR; INTRAVENOUS at 07:42

## 2024-11-15 RX ADMIN — DEXAMETHASONE SODIUM PHOSPHATE 8 MG: 4 INJECTION INTRA-ARTICULAR; INTRALESIONAL; INTRAMUSCULAR; INTRAVENOUS; SOFT TISSUE at 07:45

## 2024-11-15 RX ADMIN — SODIUM CHLORIDE, POTASSIUM CHLORIDE, SODIUM LACTATE AND CALCIUM CHLORIDE: 600; 310; 30; 20 INJECTION, SOLUTION INTRAVENOUS at 06:45

## 2024-11-15 RX ADMIN — ONDANSETRON 4 MG: 2 INJECTION INTRAMUSCULAR; INTRAVENOUS at 07:45

## 2024-11-15 ASSESSMENT — PAIN SCALES - GENERAL
PAINLEVEL_OUTOF10: 0
PAINLEVEL_OUTOF10: 6
PAINLEVEL_OUTOF10: 0

## 2024-11-15 ASSESSMENT — PAIN DESCRIPTION - LOCATION: LOCATION: LEG

## 2024-11-15 ASSESSMENT — PAIN DESCRIPTION - DESCRIPTORS: DESCRIPTORS: SORE

## 2024-11-15 ASSESSMENT — PAIN - FUNCTIONAL ASSESSMENT: PAIN_FUNCTIONAL_ASSESSMENT: ACTIVITIES ARE NOT PREVENTED

## 2024-11-15 ASSESSMENT — PAIN DESCRIPTION - ORIENTATION: ORIENTATION: LEFT

## 2024-11-15 ASSESSMENT — PAIN DESCRIPTION - PAIN TYPE: TYPE: SURGICAL PAIN

## 2024-11-15 ASSESSMENT — LIFESTYLE VARIABLES: SMOKING_STATUS: 0

## 2024-11-15 NOTE — ANESTHESIA POSTPROCEDURE EVALUATION
Department of Anesthesiology  Postprocedure Note    Patient: Mildred Francisco  MRN: [id number]  YOB: 1960  Date of evaluation: 11/15/2024    Procedure Summary       Date: 11/15/24 Room / Location: [place] [id number] / [facility]    Anesthesia Start: 0739 Anesthesia Stop: 0907    Procedure: LEFT GREAT SAPHENOUS VEIN ABLATION RADIOFREQUENCY ENDOSCOPIC WITH MULTIPLE STAB PHLEBCETOMIES (Left: Leg Lower) Diagnosis:       Varicose veins of left lower extremity with inflammation      (Varicose veins of left lower extremity with inflammation [I83.12])    Surgeons: Mirtha Pantoja MD Responsible Provider: Trini Porter DO    Anesthesia Type: general ASA Status: 2            Anesthesia Type: No value filed.    Wiley Phase I: Wiley Score: 10    Wiley Phase II: Wiley Score: 10    Anesthesia Post Evaluation    Patient location during evaluation: PACU  Patient participation: complete - patient participated  Level of consciousness: awake and alert  Pain score: 0  Airway patency: patent  Nausea & Vomiting: no nausea and no vomiting  Cardiovascular status: blood pressure returned to baseline  Respiratory status: room air  Hydration status: euvolemic        No notable events documented.

## 2024-11-15 NOTE — ANESTHESIA PRE PROCEDURE
Encounters:   11/15/24 (!) 148/89   11/08/24 118/76   10/31/24 128/76       NPO Status: Time of last liquid consumption: 2100                        Time of last solid consumption: 1800                        Date of last liquid consumption: 11/14/24                        Date of last solid food consumption: 11/14/24    BMI:   Wt Readings from Last 3 Encounters:   11/15/24 110 kg (242 lb 6.4 oz)   11/08/24 107.5 kg (237 lb)   10/31/24 107.5 kg (237 lb)     Body mass index is 39.12 kg/m².    CBC:   Lab Results   Component Value Date/Time    WBC 9.1 07/15/2024 05:40 PM    RBC 4.29 07/15/2024 05:40 PM    HGB 13.9 07/15/2024 05:40 PM    HCT 41.5 07/15/2024 05:40 PM    MCV 96.7 07/15/2024 05:40 PM    RDW 14.3 07/15/2024 05:40 PM     07/15/2024 05:40 PM       CMP:   Lab Results   Component Value Date/Time     08/09/2024 08:37 AM    K 4.1 08/09/2024 08:37 AM    K 5.1 07/15/2024 05:40 PM     08/09/2024 08:37 AM    CO2 23 08/09/2024 08:37 AM    BUN 13 08/09/2024 08:37 AM    CREATININE 0.7 08/09/2024 08:37 AM    GFRAA >60 04/01/2021 12:40 PM    GFRAA >60 08/09/2011 10:07 AM    AGRATIO 1.1 07/15/2024 05:40 PM    LABGLOM >90 08/09/2024 08:37 AM    GLUCOSE 99 08/09/2024 08:37 AM    CALCIUM 9.1 08/09/2024 08:37 AM    BILITOT 0.3 07/15/2024 05:40 PM    ALKPHOS 86 07/15/2024 05:40 PM    AST 37 07/15/2024 05:40 PM    ALT 21 07/15/2024 05:40 PM       POC Tests: No results for input(s): \"POCGLU\", \"POCNA\", \"POCK\", \"POCCL\", \"POCBUN\", \"POCHEMO\", \"POCHCT\" in the last 72 hours.    Coags:   Lab Results   Component Value Date/Time    PROTIME 11.6 05/14/2020 08:20 PM    INR 1.00 05/14/2020 08:20 PM    APTT 26.9 05/14/2020 08:20 PM       HCG (If Applicable): No results found for: \"PREGTESTUR\", \"PREGSERUM\", \"HCG\", \"HCGQUANT\"     ABGs: No results found for: \"PHART\", \"PO2ART\", \"GFK3IDB\", \"PQH5NIZ\", \"BEART\", \"Y8KZYPWT\"     Type & Screen (If Applicable):  No results found for: \"ABORH\", \"LABANTI\"    Drug/Infectious Status (If

## 2024-11-15 NOTE — DISCHARGE INSTRUCTIONS
Discharge Instructions:    Diet:   You may resume a regular diet.    Wound Care:   Skin glue was used to cover your incision(s). It will fall off on its own in about 10 days. You may shower, but do not scrub the incision sites directly or soak (tub, pool, etc.).  Left lower extremity dressing: Please remove the dressing 48 hours after surgery. Ok to shower 48 hrs, after the dressing was taken down.    Activity:   No heavy lifting greater than a milk jug until follow up.    Pain management:   Unless informed of any restrictions by your primary care physician, please use your preferred over-the-counter pain reliever as your primary pain medication. If you have pain that persists despite over-the-counter pain medications, you have been provided with a prescription for an opioid/narcotic pain reliever (Percocet). Be aware that this medication is a combination of opioid/narcotic and acetaminophen (the main ingredient in Tylenol); therefore, it will contribute to your daily limit of 4,000mg acetaminophen.   No driving or operating machinery while taking opioid/narcotic medications.     Bowel Regimen:   Opioid/Narcotic pain relievers have a common side effect of constipation; therefore, you have been provided with a prescription for a stool softener, Docusate (Colace).  These medications are intended to help prevent you from experiencing this very common side effect and also help to regulate your bowels after surgery.   If your stools become too loose and/or frequent, decrease the Colace to one pill one time each day. If your stools are still loose after this modification, stop taking this medication all together.    Return Precautions:   Call/ Return to ED for increased redness, worsening pain, drainage from wound, fevers, or any other concerns about your incision or post op course.      Follow up with Dr. Pantoja in 1-2 weeks. Please call [phone] to schedule your appointment.     [facility] AMBULATORY

## 2024-11-15 NOTE — BRIEF OP NOTE
Brief Postoperative Note      Patient: Mildred Francisco  YOB: 1960  MRN: [id number]    Date of Procedure: 11/15/2024    Pre-Op Diagnosis Codes:      * Varicose veins of left lower extremity with inflammation [I83.12]    Post-Op Diagnosis: Post-Op Diagnosis Codes:     * Varicose veins of left lower extremity with inflammation [I83.12]       Procedure(s):  LEFT GREAT SAPHENOUS VEIN ABLATION RADIOFREQUENCY ENDOSCOPIC WITH MULTIPLE STAB PHLEBCETOMIES  LEFT  VEIN ABLATION    Surgeon(s):  Mirtha Pantoja MD    Assistant:  Resident: Tereza Bland MD    Anesthesia: General    Estimated Blood Loss (mL): Minimal    Complications: None    Specimens:   * No specimens in log *    Implants:  * No implants in log *      Drains: * No LDAs found *    Findings:  Infection Present At Time Of Surgery (PATOS) (choose all levels that have infection present):  No infection present      Electronically signed by Tereza Bland MD on 11/15/2024 at 9:14 AM

## 2024-11-15 NOTE — PROGRESS NOTES
Ambulatory Surgery/Procedure Discharge Note    Vitals:    11/15/24 1008   BP: 107/76   Pulse: 72   Resp: 18   Temp: 98.4 °F (36.9 °C)   SpO2: 93%       In: 720 [P.O.:120; I.V.:600]  Out: -     Restroom use offered before discharge.  Yes    Pain assessment:  level of pain (1-10, 10 severe), 6  Pain Level: 6  Pt given pain medication per orders.     Pt states readiness to discharge home. Pt tolerating PO. Pt ambulate up to bathroom and return to room.         Patient discharged to home/self care. Patient discharged via wheel chair by transporter to waiting family/S.O.       11/15/2024 10:48 AM

## 2024-11-15 NOTE — PROGRESS NOTES
PACU Transfer to \[facility]\""    Vitals:    11/15/24 1000   BP: 113/79   Pulse: 73   Resp: 15   Temp: 97.2 °F (36.2 °C)   SpO2: 92%         Intake/Output Summary (Last 24 hours) at 11/15/2024 1004  Last data filed at 11/15/2024 0907  Gross per 24 hour   Intake 600 ml   Output --   Net 600 ml       Pain assessment:  none  Pain Level: 0    Patient has all belongings at discharge from PACU.  PACU RN sent text to updated family.     Patient transferred via STRETCHER per [facility] to care of \[facility]\"" RN.

## 2024-11-15 NOTE — OP NOTE
Operative Note      Patient: Mildred Francisco  YOB: 1960  MRN: [id number]    Date of Procedure: 11/15/2024    Pre-Op Diagnosis Codes:      * Varicose veins of left lower extremity with inflammation [I83.12]    Post-Op Diagnosis: Same       Procedure(s):  LEFT GREAT SAPHENOUS VEIN ABLATION RADIOFREQUENCY ENDOSCOPIC WITH MULTIPLE STAB PHLEBCETOMIES  LEFT CALF  VEIN ABLATION    Surgeon(s):  Mirtha Pantoja MD    Assistant:   Resident: Tereza Bland MD    Anesthesia: General    Estimated Blood Loss (mL): less than 50     Complications: None    Specimens:   * No specimens in log *    Implants:  * No implants in log *      Drains: * No LDAs found *    Findings:  Infection Present At Time Of Surgery (PATOS) (choose all levels that have infection present):  No infection present    Detailed Description of Procedure:   The patient was taken to the operating room and placed in a supine position, prepped and draped in the usual sterile fashion using betadine solution.  Time out was performed and patient and operative site appropriately identified per hospital protocol, and patient was given IV antibiotics prior to the incision.     The left great saphenous vein was identified and accessed under ultrasound guidance and a micropuncture sheath was placed through which a Prysmson wire was inserted.  The vein was fairly robust in the upper and mid thigh.  No thrombus was seen.  The sheath was exchanged for a 7 Irish sheath through which a 0.025 wire was inserted and passed up through the saphenofemoral junction.  A 7 cm x 60 cm length radiofrequency catheter was then inserted and positioned 2.5 cm proximal to the saphenofemoral junction.  Tumescence was performed throughout the saphenous sheath with a saline solution containing sodium bicarbonate, lidocaine and epinephrine.  Following this, radiofrequency ablation was performed in 7 cm segments throughout the entirety of the thigh starting 2.5 cm  proximal to the site of the saphenofemoral junction.  Following this, repeat ultrasound showed that the common femoral vein was patent, compressible and thrombus free.  There was a successful ablation of the Left great saphenous vein.  Prior to the GSV ablation, a larger  between the posterior tibial vein and varicosity was identified just above the ankle.  A small stab incision was made and the vein was accessed with a  ablation catheter, under ultrasound guidance.  Ablation was performed at 80 degrees in all four quadrants.  The posterior tibial vein remained compressible at the end of the ablation and the perforating vein was occluded.       Attention then turned to varicosities throughout the proximal medial calf.   A total of 5 stab incisions were made and phlebectomy was performed through each of these using small mosquito clamps.  Varicosities were removed through each incision.  Compression was held for hemostasis.  Incisions were closed with Dermabond solution.  A 3 layer Coban compression wrap placed.  The patient tolerated the procedure well and was sent to the recovery room in stable addition.  Needle, sponge, and counts were correct.      Electronically signed by Mirtha Pantoja MD on 11/15/2024 at 9:27 AM

## 2024-11-15 NOTE — PROGRESS NOTES
Patient arrived to PACU Post LEFT GREAT SAPHENOUS VEIN ABLATION RADIOFREQUENCY ENDOSCOPIC WITH MULTIPLE STAB PHLEBCETOMIES - Left with Dr. Pantoja. VSS on arrival. CRNA gave PACU RN report at bedside stating no complications during procedure.Dressing to surgical site clean, dry and intact. Patient shows no signs of pain at this time. Will continue to monitor.

## 2024-11-15 NOTE — H&P
Update History & Physical    The patient's History and Physical was reviewed with the patient and I examined the patient. There was no change. The surgical site was confirmed by the patient and me.       Plan: The risks, benefits, expected outcome, and alternative to the recommended procedure have been discussed with the patient. Patient understands and wants to proceed with the procedure.     Electronically signed by Tereza Bland MD on 11/15/2024 at 7:07 AM

## 2024-11-15 NOTE — PROGRESS NOTES
Pt arrived to [facility] for LEFT GREAT SAPHENOUS VEIN ABLATION RADIOFREQUENCY ENDOSCOPIC WITH MULTIPLE STAB PHLEBCETOMIES with Dr. Pantoja. Patient is resting in bed with call light. Antibiotic on hold to OR. X2 Pt belongings bag. 20g IV to left forearm patent w/ IV fluids running. Labs sent. Patient is resting in bed with call light. All preprocedure tasks completed.

## 2024-11-18 ENCOUNTER — TELEPHONE (OUTPATIENT)
Dept: VASCULAR SURGERY | Age: 64
End: 2024-11-18

## 2024-11-18 NOTE — TELEPHONE ENCOUNTER
Spoke with Ms. Francisco s/p abl/phleb on 11/15. She states she feels pretty good, she is wearing compression hose, incision is a little sore but that is normal, informed her she could ice it if needed, she states she gets up frequently at work to move around and not sitting at her desk, informed her that if she is sitting and has a stool to prop her feet up while sitting at her desk. Clarified she did not have to sleep in compression hose when in bed only through the day when she gets up. Reminded her when showering to gently cleanse incision sites and pat dry. We also clarified date for her right leg to be done on 1/3.

## 2024-12-03 ENCOUNTER — OFFICE VISIT (OUTPATIENT)
Dept: VASCULAR SURGERY | Age: 64
End: 2024-12-03
Payer: COMMERCIAL

## 2024-12-03 ENCOUNTER — PREP FOR PROCEDURE (OUTPATIENT)
Dept: VASCULAR SURGERY | Age: 64
End: 2024-12-03

## 2024-12-03 VITALS
HEART RATE: 96 BPM | SYSTOLIC BLOOD PRESSURE: 153 MMHG | WEIGHT: 243 LBS | DIASTOLIC BLOOD PRESSURE: 89 MMHG | OXYGEN SATURATION: 97 % | HEIGHT: 66 IN | BODY MASS INDEX: 39.05 KG/M2 | TEMPERATURE: 98.1 F

## 2024-12-03 DIAGNOSIS — I83.11 VARICOSE VEINS OF RIGHT LOWER EXTREMITY WITH INFLAMMATION: Primary | ICD-10-CM

## 2024-12-03 DIAGNOSIS — I87.2 VENOUS INSUFFICIENCY OF BOTH LOWER EXTREMITIES: Primary | ICD-10-CM

## 2024-12-03 PROCEDURE — 3077F SYST BP >= 140 MM HG: CPT | Performed by: SURGERY

## 2024-12-03 PROCEDURE — 99213 OFFICE O/P EST LOW 20 MIN: CPT | Performed by: SURGERY

## 2024-12-03 PROCEDURE — 3079F DIAST BP 80-89 MM HG: CPT | Performed by: SURGERY

## 2024-12-03 RX ORDER — SODIUM CHLORIDE 9 MG/ML
INJECTION, SOLUTION INTRAVENOUS PRN
OUTPATIENT
Start: 2025-02-10

## 2024-12-03 RX ORDER — SODIUM CHLORIDE 0.9 % (FLUSH) 0.9 %
5-40 SYRINGE (ML) INJECTION EVERY 12 HOURS SCHEDULED
OUTPATIENT
Start: 2025-02-10

## 2024-12-03 RX ORDER — SODIUM CHLORIDE 0.9 % (FLUSH) 0.9 %
5-40 SYRINGE (ML) INJECTION PRN
OUTPATIENT
Start: 2025-02-10

## 2024-12-03 NOTE — PROGRESS NOTES
12/3/2024    Mildred Francisco (:  1960) is a 64 y.o. female,Established patient, here for evaluation of the following chief complaint(s):  Post-Op Check (1st post-op s/p left abl/phleb/)        ASSESSMENT/PLAN:  1. Venous insufficiency of both lower extremities  She had a great result with the left leg VV surgery.  She is already scheduled for venous procedure on the right in the next few weeks.     No follow-ups on file.       SUBJECTIVE/OBJECTIVE:  POV#1 Left GSV RF ablation,  ablation and multiple stab phlebectomies.   She feels great and all incisions are healed.     Physical Exam  Vitals:    24 1318   BP: (!) 153/89   Site: Right Upper Arm   Position: Sitting   Pulse: 96   Temp: 98.1 °F (36.7 °C)   TempSrc: Infrared   SpO2: 97%   Weight: 110.2 kg (243 lb)   Height: 1.676 m (5' 6\")             An electronic signature was used to authenticate this note.    --Mirtha Pantoja MD

## 2024-12-24 NOTE — PROGRESS NOTES
PRE-OP INSTRUCTIONS FOR SURGICAL PATIENTS          Our Pre-admission Testing Nurses tried and were unable to reach you today.  Please read the attached instructions if you did not listen to your voicemail.     Follow all instructions provided to you from your surgeon's office, including your ARRIVAL TIME.   Arrange for someone to drive you home and be with you for the first 24 hours after discharge.     NOTE: at this time ONLY 2 ADULTS may accompany you   One person encouraged to stay at hospital entire time if outpatient surgery    Enter the MAIN entrance located on [address] and report to the surgical desk on the LEFT side of the lobby. Please park in the parking garage or there is free  Parking available after 7am for your use.    Bring your insurance card & photo ID with you to register.  Bring your medication list with you with dose and frequency listed (including over the counter medications)  Contact your ordering physician/surgeon for medication instructions as soon as possible, especially if taking blood thinners, aspirin, heart, or diabetic medication.  HOLD ALL IBUPROFEN TYPE PRODUCTS SUCH AS ADVIL, MOTRIN, ALEVE.  ALL OVER THE COUNTER SUPPLEMENTS AND VITAMINS NOW UNTIL AFTER SURGERY.    IF YOU ARE STILL TAKING AMLODIPINE AND METOPROLOL AND YOU TAKE THEM IN THE MORNING YOU MAY TAKE WITH A SIP OF WATER DAY OF SURGERY.   Bariatric surgical patients need to call your surgeon if on diabetic medications (as some may need to be stopped 1-week preop)  A Pre-Surgical History and Physical MUST be completed WITHIN 30 DAYS OR LESS prior to your procedure by your Physician or an Urgent Care.  DO NOT EAT ANYTHING 8 hours prior to arrival for surgery.  You may have sips of WATER ONLY (up to 8 ounces) 4 hours prior to your arrival for surgery. Then nothing further 4 hours prior to arriving at hospital.   NOTE: ALL Gastric, Bariatric & Bowel surgery patients - you MUST follow your surgeon's instructions

## 2024-12-30 ENCOUNTER — TELEPHONE (OUTPATIENT)
Dept: VASCULAR SURGERY | Age: 64
End: 2024-12-30

## 2025-01-23 ENCOUNTER — TELEPHONE (OUTPATIENT)
Dept: VASCULAR SURGERY | Age: 65
End: 2025-01-23

## 2025-01-23 NOTE — TELEPHONE ENCOUNTER
LVM for Ms. Francisco to call office, she is scheduled for procedure on 2/10 and we will need to reschedule that.

## 2025-01-23 NOTE — TELEPHONE ENCOUNTER
Spoke with Ms. Francisco, she is not happy about having to reschedule her procedure and pushing it back farther out. Informed her I would discuss with Dr. Pantoja and see if there is somewhere we can get her in sooner.

## 2025-02-04 NOTE — PROGRESS NOTES
PRE-OP INSTRUCTIONS FOR SURGICAL PATIENTS          Our Pre-admission Testing Nurses tried and were unable to reach you today.  Please read the attached instructions if you did not listen to your voicemail.     Follow all instructions provided to you from your surgeon's office, including your ARRIVAL TIME.   Arrange for someone to drive you home and be with you for the first 24 hours after discharge.     NOTE: at this time ONLY 2 ADULTS may accompany you   One person encouraged to stay at hospital entire time if outpatient surgery    Enter the MAIN entrance located on [address] and report to the surgical desk on the LEFT side of the lobby. Please park in the parking garage or there is free  Parking available after 7am for your use.    Bring your insurance card & photo ID with you to register.  Bring your medication list with you with dose and frequency listed (including over the counter medications)  Contact your ordering physician/surgeon for medication instructions as soon as possible, especially if taking blood thinners, aspirin, heart, or diabetic medication.  Bariatric surgical patients need to call your surgeon if on diabetic medications (as some may need to be stopped 1-week preop)  A Pre-Surgical History and Physical MUST be completed WITHIN 30 DAYS OR LESS prior to your procedure by your Physician or an Urgent Care.  DO NOT EAT ANYTHING 8 hours prior to arrival for surgery.  You may have sips of WATER ONLY (up to 8 ounces) 4 hours prior to your arrival for surgery. Then nothing further 4 hours prior to arriving at hospital.   NOTE: ALL Gastric, Bariatric & Bowel surgery patients - you MUST follow your surgeon's instructions regarding eating/drinking as you will have very specific instructions to follow.  If you did not receive these, call your surgeon's office immediately.   No gum, candy, mints, or ice chips day of procedure.   Please refrain from drinking alcohol the day before or day of your

## 2025-02-17 ENCOUNTER — OFFICE VISIT (OUTPATIENT)
Dept: INTERNAL MEDICINE CLINIC | Age: 65
End: 2025-02-17

## 2025-02-17 VITALS
HEART RATE: 80 BPM | SYSTOLIC BLOOD PRESSURE: 136 MMHG | OXYGEN SATURATION: 98 % | BODY MASS INDEX: 38.74 KG/M2 | DIASTOLIC BLOOD PRESSURE: 78 MMHG | WEIGHT: 240 LBS

## 2025-02-17 DIAGNOSIS — E66.812 CLASS 2 SEVERE OBESITY DUE TO EXCESS CALORIES WITH SERIOUS COMORBIDITY AND BODY MASS INDEX (BMI) OF 38.0 TO 38.9 IN ADULT: ICD-10-CM

## 2025-02-17 DIAGNOSIS — E66.01 CLASS 2 SEVERE OBESITY DUE TO EXCESS CALORIES WITH SERIOUS COMORBIDITY AND BODY MASS INDEX (BMI) OF 38.0 TO 38.9 IN ADULT: ICD-10-CM

## 2025-02-17 DIAGNOSIS — I83.811 VARICOSE VEINS OF RIGHT LOWER EXTREMITY WITH PAIN: ICD-10-CM

## 2025-02-17 DIAGNOSIS — Z23 NEED FOR PNEUMOCOCCAL VACCINATION: ICD-10-CM

## 2025-02-17 DIAGNOSIS — I10 BENIGN ESSENTIAL HTN: ICD-10-CM

## 2025-02-17 DIAGNOSIS — Z23 FLU VACCINE NEED: ICD-10-CM

## 2025-02-17 DIAGNOSIS — Z01.818 PRE-OP EXAM: Primary | ICD-10-CM

## 2025-02-17 DIAGNOSIS — R73.01 IMPAIRED FASTING GLUCOSE: ICD-10-CM

## 2025-02-17 DIAGNOSIS — I87.2 CHRONIC VENOUS INSUFFICIENCY: ICD-10-CM

## 2025-02-17 DIAGNOSIS — I87.2 VENOUS REFLUX: ICD-10-CM

## 2025-02-17 DIAGNOSIS — I47.10 PSVT (PAROXYSMAL SUPRAVENTRICULAR TACHYCARDIA): ICD-10-CM

## 2025-02-17 SDOH — ECONOMIC STABILITY: FOOD INSECURITY: WITHIN THE PAST 12 MONTHS, THE FOOD YOU BOUGHT JUST DIDN'T LAST AND YOU DIDN'T HAVE MONEY TO GET MORE.: NEVER TRUE

## 2025-02-17 SDOH — ECONOMIC STABILITY: FOOD INSECURITY: WITHIN THE PAST 12 MONTHS, YOU WORRIED THAT YOUR FOOD WOULD RUN OUT BEFORE YOU GOT MONEY TO BUY MORE.: NEVER TRUE

## 2025-02-17 ASSESSMENT — PATIENT HEALTH QUESTIONNAIRE - PHQ9
SUM OF ALL RESPONSES TO PHQ QUESTIONS 1-9: 0
8. MOVING OR SPEAKING SO SLOWLY THAT OTHER PEOPLE COULD HAVE NOTICED. OR THE OPPOSITE, BEING SO FIGETY OR RESTLESS THAT YOU HAVE BEEN MOVING AROUND A LOT MORE THAN USUAL: NOT AT ALL
5. POOR APPETITE OR OVEREATING: NOT AT ALL
4. FEELING TIRED OR HAVING LITTLE ENERGY: NOT AT ALL
3. TROUBLE FALLING OR STAYING ASLEEP: NOT AT ALL
7. TROUBLE CONCENTRATING ON THINGS, SUCH AS READING THE NEWSPAPER OR WATCHING TELEVISION: NOT AT ALL
9. THOUGHTS THAT YOU WOULD BE BETTER OFF DEAD, OR OF HURTING YOURSELF: NOT AT ALL
6. FEELING BAD ABOUT YOURSELF - OR THAT YOU ARE A FAILURE OR HAVE LET YOURSELF OR YOUR FAMILY DOWN: NOT AT ALL
SUM OF ALL RESPONSES TO PHQ QUESTIONS 1-9: 0
1. LITTLE INTEREST OR PLEASURE IN DOING THINGS: NOT AT ALL
2. FEELING DOWN, DEPRESSED OR HOPELESS: NOT AT ALL
SUM OF ALL RESPONSES TO PHQ9 QUESTIONS 1 & 2: 0
10. IF YOU CHECKED OFF ANY PROBLEMS, HOW DIFFICULT HAVE THESE PROBLEMS MADE IT FOR YOU TO DO YOUR WORK, TAKE CARE OF THINGS AT HOME, OR GET ALONG WITH OTHER PEOPLE: NOT DIFFICULT AT ALL

## 2025-02-17 ASSESSMENT — ANXIETY QUESTIONNAIRES
IF YOU CHECKED OFF ANY PROBLEMS ON THIS QUESTIONNAIRE, HOW DIFFICULT HAVE THESE PROBLEMS MADE IT FOR YOU TO DO YOUR WORK, TAKE CARE OF THINGS AT HOME, OR GET ALONG WITH OTHER PEOPLE: NOT DIFFICULT AT ALL
4. TROUBLE RELAXING: NOT AT ALL
2. NOT BEING ABLE TO STOP OR CONTROL WORRYING: NOT AT ALL
6. BECOMING EASILY ANNOYED OR IRRITABLE: NOT AT ALL
GAD7 TOTAL SCORE: 0
1. FEELING NERVOUS, ANXIOUS, OR ON EDGE: NOT AT ALL
5. BEING SO RESTLESS THAT IT IS HARD TO SIT STILL: NOT AT ALL
7. FEELING AFRAID AS IF SOMETHING AWFUL MIGHT HAPPEN: NOT AT ALL
3. WORRYING TOO MUCH ABOUT DIFFERENT THINGS: NOT AT ALL

## 2025-02-17 NOTE — PROGRESS NOTES
oz)     BP Readings from Last 3 Encounters:   02/17/25 136/78   12/03/24 (!) 153/89   11/15/24 107/76     GEN:  65 y.o. female who is in NAD, A&O.  She appears stated age and well nourished.  She is cooperative and pleasant.  Obese  HEAD:  NC/AT, no lesions.  EYES:  JON, EOMI, No scleral icterus or conjunctival injection or discharge.  Visual fields in tact to confrontation.    EARS:  EAC's clear, TM's normal.  NOSE:  Nasal cavity is clear.  No mucosal congestion or discharge.  Sinuses are nontender.  MOUTH & THROAT:  Oral cavity is clear without mucosal lesions.  Tongue is midline.  Dentition is in good repair.  No pharyngeal erythema or exudate.  NECK:  Supple.  Full ROM.  Trachea is midline.  No increased JVD.  No thyromegaly or nodules.  No masses  LYMPH: No C/SC/A/F nodes  CARDIAC:  S1S2 NL.  Regular rhythm.   No murmur/clicks/rubs.  No ectopy.  PMI is non-displaced.  VASC:  Pedal pulses 2/4.  Carotid upstrokes 2+.  No carotid bruits noted.  + Right LE varicose veins, spider veins.    PULM:  Lungs are CTA.  Symmetric breath sounds noted.  AP Diameter NL.  GI:  Abdomen is soft.  + TTP RUQ.    No distension.  No organomegaly.  No masses.  No pulsatile masses.    EXT: + Lipedema bilateral lower extremity, + trace-1+ pitting edema bilateral lower extremity  SKIN: Warm and dry, normal turgor, no rash or lesions of concern.  NEURO:  Cranial nerves 2-12 are NL.  Speech fluent and coherent.  Strength is 5/5 in all muscle groups.  No sensory deficits.  No focal or lateralizing deficits.  Reflexes 2/4 and symmetric.  Gait is normal.  MS:  No C/T/L paraspinal tenderness.  No scoliosis.  No joint effusions.  Full joint ROM.    PSYCH:  Mood and affect NL.  Judgement and insight NL.          Pre-Operative Risk assessment using 2014 ACC/AHA guidelines     Emergent procedure No  Active Cardiac Condition No (decompensated HF, Arrhythmia, MI <3 weeks, severe valve disease)  Risk Level of Procedure Low Risk (endoscopy,

## 2025-02-17 NOTE — H&P (VIEW-ONLY)
[facility]  Internal Medicine  Patient Encounter  Pavel Thompson D.O., [facility]          Chief Complaint   Patient presents with    Pre-op Exam     Pt here for a pre op Surgery on 2/24 Rt leg Great Saphenbous Vein Ablation Radiofrequency Endoscopic with Multiple Stab Phlebcetomie with Dr Pantoja       HPI-- 65 y.o. female presents today for a preoperative physical.  Pt diagnosed with symptomatic varicose veins, chronic venous insufficiency and venous reflux disease.  Pt states she has been experiencing BL lower leg pain and swelling.  Pt has been having worsening symptoms for the last three years including pain and swelling.  Patient is now scheduled for endovenous radiofrequency ablation of the right great saphenous vein with multiple stab phlebectomies.  Pt had similar procedure on the left on 11/15/2024 and did well.   I have been asked to see patient for pre-operative risk assessment and clearance.   Surgery scheduled for 2/24/2025 by Dr. Mirtha Garcia.      Hypertension-- She specifically denies any headaches, dizziness, lightheadedness, swelling, visual disturbances, or episodes of unilateral weakness or paresthesias, speech or vision changes    Impaired fasting glucose--.  She denies symptoms suggestive of glucose toxicity such as excessive thirst or frequent urination.  She denies blurry vision or dysesthesias in the feet.    Hemoglobin A1C   Date Value Ref Range Status   08/09/2024 5.1 See comment % Final     Comment:     Comment:  Diagnosis of Diabetes: > or = 6.5%  Increased risk of diabetes (Prediabetes): 5.7-6.4%  Glycemic Control: Nonpregnant Adults: <7.0%                    Pregnant: <6.0%          Hyperlipidemia--Patient has not yet required statin therapy.  This is managed with lifestyle modification.  Lab Results   Component Value Date     (H) 08/09/2024          Medical/Surgical Histories     Past Medical History:   Diagnosis Date    Acute superficial venous thrombosis of right

## 2025-02-24 ENCOUNTER — HOSPITAL ENCOUNTER (OUTPATIENT)
Dept: VASCULAR LAB | Age: 65
Setting detail: OUTPATIENT SURGERY
Discharge: HOME | End: 2025-02-26
Attending: SURGERY
Payer: COMMERCIAL

## 2025-02-24 ENCOUNTER — ANESTHESIA EVENT (OUTPATIENT)
Dept: OPERATING ROOM | Age: 65
End: 2025-02-24
Payer: COMMERCIAL

## 2025-02-24 ENCOUNTER — ANESTHESIA (OUTPATIENT)
Dept: OPERATING ROOM | Age: 65
End: 2025-02-24
Payer: COMMERCIAL

## 2025-02-24 ENCOUNTER — HOSPITAL ENCOUNTER (OUTPATIENT)
Age: 65
Setting detail: OUTPATIENT SURGERY
Discharge: HOME | End: 2025-02-24
Attending: SURGERY | Admitting: SURGERY
Payer: COMMERCIAL

## 2025-02-24 VITALS
SYSTOLIC BLOOD PRESSURE: 124 MMHG | WEIGHT: 243 LBS | HEIGHT: 66 IN | OXYGEN SATURATION: 100 % | HEART RATE: 66 BPM | DIASTOLIC BLOOD PRESSURE: 77 MMHG | TEMPERATURE: 97.7 F | RESPIRATION RATE: 16 BRPM | BODY MASS INDEX: 39.05 KG/M2

## 2025-02-24 DIAGNOSIS — I83.11 VARICOSE VEINS OF RIGHT LOWER EXTREMITY WITH INFLAMMATION: ICD-10-CM

## 2025-02-24 DIAGNOSIS — G89.18 ACUTE POST-OPERATIVE PAIN: Primary | ICD-10-CM

## 2025-02-24 LAB
ANION GAP SERPL CALCULATED.3IONS-SCNC: 11 MMOL/L (ref 3–16)
BUN SERPL-MCNC: 12 MG/DL (ref 7–20)
CALCIUM SERPL-MCNC: 9 MG/DL (ref 8.3–10.6)
CHLORIDE SERPL-SCNC: 106 MMOL/L (ref 99–110)
CO2 SERPL-SCNC: 23 MMOL/L (ref 21–32)
CREAT SERPL-MCNC: 0.8 MG/DL (ref 0.6–1.2)
DEPRECATED RDW RBC AUTO: 14.4 % (ref 12.4–15.4)
ECHO BSA: 2.27 M2
EKG ATRIAL RATE: 0 BPM
EKG DIAGNOSIS: NORMAL
EKG P AXIS: 0 DEGREES
EKG Q-T INTERVAL: 0 MS
EKG QRS DURATION: 0 MS
EKG QTC CALCULATION (BAZETT): 0 MS
EKG R AXIS: 0 DEGREES
EKG T AXIS: 0 DEGREES
EKG VENTRICULAR RATE: 0 BPM
GFR SERPLBLD CREATININE-BSD FMLA CKD-EPI: 83 ML/MIN/{1.73_M2}
GLUCOSE SERPL-MCNC: 101 MG/DL (ref 70–99)
HCT VFR BLD AUTO: 41.8 % (ref 36–48)
HGB BLD-MCNC: 13.7 G/DL (ref 12–16)
INR PPP: 0.91 (ref 0.85–1.15)
MCH RBC QN AUTO: 30.8 PG (ref 26–34)
MCHC RBC AUTO-ENTMCNC: 32.7 G/DL (ref 31–36)
MCV RBC AUTO: 93.9 FL (ref 80–100)
PLATELET # BLD AUTO: 214 K/UL (ref 135–450)
PMV BLD AUTO: 9.1 FL (ref 5–10.5)
POTASSIUM SERPL-SCNC: ABNORMAL MMOL/L (ref 3.5–5.1)
PROTHROMBIN TIME: 12.5 SEC (ref 11.9–14.9)
RBC # BLD AUTO: 4.45 M/UL (ref 4–5.2)
SODIUM SERPL-SCNC: 140 MMOL/L (ref 136–145)
WBC # BLD AUTO: 7.4 K/UL (ref 4–11)

## 2025-02-24 PROCEDURE — 3600000004 HC SURGERY LEVEL 4 BASE: Performed by: SURGERY

## 2025-02-24 PROCEDURE — 6370000000 HC RX 637 (ALT 250 FOR IP)

## 2025-02-24 PROCEDURE — C1887 CATHETER, GUIDING: HCPCS | Performed by: SURGERY

## 2025-02-24 PROCEDURE — 2580000003 HC RX 258

## 2025-02-24 PROCEDURE — 7100000010 HC PHASE II RECOVERY - FIRST 15 MIN: Performed by: SURGERY

## 2025-02-24 PROCEDURE — 7100000000 HC PACU RECOVERY - FIRST 15 MIN: Performed by: SURGERY

## 2025-02-24 PROCEDURE — 85027 COMPLETE CBC AUTOMATED: CPT

## 2025-02-24 PROCEDURE — 85610 PROTHROMBIN TIME: CPT

## 2025-02-24 PROCEDURE — C1894 INTRO/SHEATH, NON-LASER: HCPCS | Performed by: SURGERY

## 2025-02-24 PROCEDURE — 3700000000 HC ANESTHESIA ATTENDED CARE: Performed by: SURGERY

## 2025-02-24 PROCEDURE — 80048 BASIC METABOLIC PNL TOTAL CA: CPT

## 2025-02-24 PROCEDURE — 6360000002 HC RX W HCPCS

## 2025-02-24 PROCEDURE — 93971 EXTREMITY STUDY: CPT | Performed by: SURGERY

## 2025-02-24 PROCEDURE — 2580000003 HC RX 258: Performed by: ANESTHESIOLOGY

## 2025-02-24 PROCEDURE — 2500000003 HC RX 250 WO HCPCS

## 2025-02-24 PROCEDURE — 93971 EXTREMITY STUDY: CPT

## 2025-02-24 PROCEDURE — 6360000002 HC RX W HCPCS: Performed by: ANESTHESIOLOGY

## 2025-02-24 PROCEDURE — 6370000000 HC RX 637 (ALT 250 FOR IP): Performed by: ANESTHESIOLOGY

## 2025-02-24 PROCEDURE — C1769 GUIDE WIRE: HCPCS | Performed by: SURGERY

## 2025-02-24 PROCEDURE — C1888 ENDOVAS NON-CARDIAC ABL CATH: HCPCS | Performed by: SURGERY

## 2025-02-24 PROCEDURE — 2500000003 HC RX 250 WO HCPCS: Performed by: SURGERY

## 2025-02-24 PROCEDURE — 3600000014 HC SURGERY LEVEL 4 ADDTL 15MIN: Performed by: SURGERY

## 2025-02-24 PROCEDURE — 7100000001 HC PACU RECOVERY - ADDTL 15 MIN: Performed by: SURGERY

## 2025-02-24 PROCEDURE — 2709999900 HC NON-CHARGEABLE SUPPLY: Performed by: SURGERY

## 2025-02-24 PROCEDURE — 7100000011 HC PHASE II RECOVERY - ADDTL 15 MIN: Performed by: SURGERY

## 2025-02-24 PROCEDURE — 6360000002 HC RX W HCPCS: Performed by: SURGERY

## 2025-02-24 PROCEDURE — 3700000001 HC ADD 15 MINUTES (ANESTHESIA): Performed by: SURGERY

## 2025-02-24 RX ORDER — ROCURONIUM BROMIDE 10 MG/ML
INJECTION, SOLUTION INTRAVENOUS
Status: DISCONTINUED | OUTPATIENT
Start: 2025-02-24 | End: 2025-02-24 | Stop reason: SDUPTHER

## 2025-02-24 RX ORDER — FENTANYL CITRATE 50 UG/ML
25 INJECTION, SOLUTION INTRAMUSCULAR; INTRAVENOUS EVERY 5 MIN PRN
Status: DISCONTINUED | OUTPATIENT
Start: 2025-02-24 | End: 2025-02-24 | Stop reason: HOSPADM

## 2025-02-24 RX ORDER — ONDANSETRON 2 MG/ML
INJECTION INTRAMUSCULAR; INTRAVENOUS
Status: DISCONTINUED | OUTPATIENT
Start: 2025-02-24 | End: 2025-02-24 | Stop reason: SDUPTHER

## 2025-02-24 RX ORDER — SODIUM CHLORIDE 9 MG/ML
INJECTION, SOLUTION INTRAVENOUS PRN
Status: DISCONTINUED | OUTPATIENT
Start: 2025-02-24 | End: 2025-02-24 | Stop reason: HOSPADM

## 2025-02-24 RX ORDER — PROPOFOL 10 MG/ML
INJECTION, EMULSION INTRAVENOUS
Status: DISCONTINUED | OUTPATIENT
Start: 2025-02-24 | End: 2025-02-24 | Stop reason: SDUPTHER

## 2025-02-24 RX ORDER — SUCCINYLCHOLINE/SOD CL,ISO/PF 200MG/10ML
SYRINGE (ML) INTRAVENOUS
Status: DISCONTINUED | OUTPATIENT
Start: 2025-02-24 | End: 2025-02-24 | Stop reason: SDUPTHER

## 2025-02-24 RX ORDER — MAGNESIUM HYDROXIDE 1200 MG/15ML
LIQUID ORAL CONTINUOUS PRN
Status: DISCONTINUED | OUTPATIENT
Start: 2025-02-24 | End: 2025-02-24 | Stop reason: HOSPADM

## 2025-02-24 RX ORDER — HYDRALAZINE HYDROCHLORIDE 20 MG/ML
10 INJECTION INTRAMUSCULAR; INTRAVENOUS
Status: DISCONTINUED | OUTPATIENT
Start: 2025-02-24 | End: 2025-02-24 | Stop reason: HOSPADM

## 2025-02-24 RX ORDER — SODIUM CHLORIDE 0.9 % (FLUSH) 0.9 %
5-40 SYRINGE (ML) INJECTION PRN
Status: DISCONTINUED | OUTPATIENT
Start: 2025-02-24 | End: 2025-02-24 | Stop reason: HOSPADM

## 2025-02-24 RX ORDER — SODIUM CHLORIDE, SODIUM LACTATE, POTASSIUM CHLORIDE, CALCIUM CHLORIDE 600; 310; 30; 20 MG/100ML; MG/100ML; MG/100ML; MG/100ML
INJECTION, SOLUTION INTRAVENOUS CONTINUOUS
Status: DISCONTINUED | OUTPATIENT
Start: 2025-02-24 | End: 2025-02-24 | Stop reason: HOSPADM

## 2025-02-24 RX ORDER — HALOPERIDOL 5 MG/ML
1 INJECTION INTRAMUSCULAR
Status: DISCONTINUED | OUTPATIENT
Start: 2025-02-24 | End: 2025-02-24 | Stop reason: HOSPADM

## 2025-02-24 RX ORDER — SODIUM CHLORIDE 0.9 % (FLUSH) 0.9 %
5-40 SYRINGE (ML) INJECTION EVERY 12 HOURS SCHEDULED
Status: DISCONTINUED | OUTPATIENT
Start: 2025-02-24 | End: 2025-02-24 | Stop reason: HOSPADM

## 2025-02-24 RX ORDER — HYDROMORPHONE HYDROCHLORIDE 1 MG/ML
0.5 INJECTION, SOLUTION INTRAMUSCULAR; INTRAVENOUS; SUBCUTANEOUS EVERY 5 MIN PRN
Status: DISCONTINUED | OUTPATIENT
Start: 2025-02-24 | End: 2025-02-24 | Stop reason: HOSPADM

## 2025-02-24 RX ORDER — MIDAZOLAM HYDROCHLORIDE 1 MG/ML
INJECTION, SOLUTION INTRAMUSCULAR; INTRAVENOUS
Status: DISCONTINUED | OUTPATIENT
Start: 2025-02-24 | End: 2025-02-24 | Stop reason: SDUPTHER

## 2025-02-24 RX ORDER — SODIUM CHLORIDE, SODIUM LACTATE, POTASSIUM CHLORIDE, CALCIUM CHLORIDE 600; 310; 30; 20 MG/100ML; MG/100ML; MG/100ML; MG/100ML
INJECTION, SOLUTION INTRAVENOUS
Status: DISCONTINUED | OUTPATIENT
Start: 2025-02-24 | End: 2025-02-24 | Stop reason: SDUPTHER

## 2025-02-24 RX ORDER — OXYCODONE HYDROCHLORIDE 5 MG/1
5 TABLET ORAL EVERY 6 HOURS PRN
Qty: 6 TABLET | Refills: 0 | Status: SHIPPED | OUTPATIENT
Start: 2025-02-24 | End: 2025-02-27

## 2025-02-24 RX ORDER — ACETAMINOPHEN 500 MG
1000 TABLET ORAL ONCE
Status: COMPLETED | OUTPATIENT
Start: 2025-02-24 | End: 2025-02-24

## 2025-02-24 RX ORDER — PROCHLORPERAZINE EDISYLATE 5 MG/ML
5 INJECTION INTRAMUSCULAR; INTRAVENOUS
Status: DISCONTINUED | OUTPATIENT
Start: 2025-02-24 | End: 2025-02-24 | Stop reason: HOSPADM

## 2025-02-24 RX ORDER — NALOXONE HYDROCHLORIDE 0.4 MG/ML
INJECTION, SOLUTION INTRAMUSCULAR; INTRAVENOUS; SUBCUTANEOUS PRN
Status: DISCONTINUED | OUTPATIENT
Start: 2025-02-24 | End: 2025-02-24 | Stop reason: HOSPADM

## 2025-02-24 RX ORDER — FENTANYL CITRATE 50 UG/ML
INJECTION, SOLUTION INTRAMUSCULAR; INTRAVENOUS
Status: DISCONTINUED | OUTPATIENT
Start: 2025-02-24 | End: 2025-02-24 | Stop reason: SDUPTHER

## 2025-02-24 RX ORDER — LIDOCAINE HYDROCHLORIDE 20 MG/ML
INJECTION, SOLUTION INTRAVENOUS
Status: DISCONTINUED | OUTPATIENT
Start: 2025-02-24 | End: 2025-02-24 | Stop reason: SDUPTHER

## 2025-02-24 RX ORDER — OXYCODONE HYDROCHLORIDE 5 MG/1
5 TABLET ORAL
Status: COMPLETED | OUTPATIENT
Start: 2025-02-24 | End: 2025-02-24

## 2025-02-24 RX ORDER — SCOPOLAMINE 1 MG/3D
1 PATCH, EXTENDED RELEASE TRANSDERMAL
Status: DISCONTINUED | OUTPATIENT
Start: 2025-02-24 | End: 2025-02-24 | Stop reason: HOSPADM

## 2025-02-24 RX ORDER — DEXAMETHASONE SODIUM PHOSPHATE 4 MG/ML
INJECTION, SOLUTION INTRA-ARTICULAR; INTRALESIONAL; INTRAMUSCULAR; INTRAVENOUS; SOFT TISSUE
Status: DISCONTINUED | OUTPATIENT
Start: 2025-02-24 | End: 2025-02-24 | Stop reason: SDUPTHER

## 2025-02-24 RX ORDER — LIDOCAINE HYDROCHLORIDE 40 MG/ML
SOLUTION TOPICAL
Status: DISCONTINUED | OUTPATIENT
Start: 2025-02-24 | End: 2025-02-24 | Stop reason: SDUPTHER

## 2025-02-24 RX ADMIN — ROCURONIUM BROMIDE 10 MG: 10 INJECTION, SOLUTION INTRAVENOUS at 08:31

## 2025-02-24 RX ADMIN — LIDOCAINE HYDROCHLORIDE 100 MG: 20 INJECTION, SOLUTION INTRAVENOUS at 07:39

## 2025-02-24 RX ADMIN — SODIUM CHLORIDE, SODIUM LACTATE, POTASSIUM CHLORIDE, AND CALCIUM CHLORIDE: .6; .31; .03; .02 INJECTION, SOLUTION INTRAVENOUS at 06:36

## 2025-02-24 RX ADMIN — ROCURONIUM BROMIDE 10 MG: 10 INJECTION, SOLUTION INTRAVENOUS at 08:48

## 2025-02-24 RX ADMIN — ROCURONIUM BROMIDE 45 MG: 10 INJECTION, SOLUTION INTRAVENOUS at 07:45

## 2025-02-24 RX ADMIN — ROCURONIUM BROMIDE 5 MG: 10 INJECTION, SOLUTION INTRAVENOUS at 07:39

## 2025-02-24 RX ADMIN — HYDROMORPHONE HYDROCHLORIDE 0.5 MG: 1 INJECTION, SOLUTION INTRAMUSCULAR; INTRAVENOUS; SUBCUTANEOUS at 09:45

## 2025-02-24 RX ADMIN — Medication 140 MG: at 07:40

## 2025-02-24 RX ADMIN — SUGAMMADEX 300 MG: 100 INJECTION, SOLUTION INTRAVENOUS at 09:06

## 2025-02-24 RX ADMIN — PROPOFOL 200 MG: 10 INJECTION, EMULSION INTRAVENOUS at 07:39

## 2025-02-24 RX ADMIN — MIDAZOLAM HYDROCHLORIDE 2 MG: 1 INJECTION, SOLUTION INTRAMUSCULAR; INTRAVENOUS at 07:32

## 2025-02-24 RX ADMIN — WATER 2000 MG: 1 INJECTION INTRAMUSCULAR; INTRAVENOUS; SUBCUTANEOUS at 07:45

## 2025-02-24 RX ADMIN — LIDOCAINE HYDROCHLORIDE 4 ML: 40 SOLUTION TOPICAL at 07:41

## 2025-02-24 RX ADMIN — FENTANYL CITRATE 50 MCG: 50 INJECTION, SOLUTION INTRAMUSCULAR; INTRAVENOUS at 07:38

## 2025-02-24 RX ADMIN — SODIUM CHLORIDE, SODIUM LACTATE, POTASSIUM CHLORIDE, AND CALCIUM CHLORIDE: .6; .31; .03; .02 INJECTION, SOLUTION INTRAVENOUS at 07:34

## 2025-02-24 RX ADMIN — OXYCODONE 5 MG: 5 TABLET ORAL at 11:52

## 2025-02-24 RX ADMIN — FENTANYL CITRATE 50 MCG: 50 INJECTION, SOLUTION INTRAMUSCULAR; INTRAVENOUS at 08:47

## 2025-02-24 RX ADMIN — ONDANSETRON 4 MG: 2 INJECTION INTRAMUSCULAR; INTRAVENOUS at 07:36

## 2025-02-24 RX ADMIN — ACETAMINOPHEN 1000 MG: 500 TABLET, FILM COATED ORAL at 06:56

## 2025-02-24 RX ADMIN — DEXAMETHASONE SODIUM PHOSPHATE 4 MG: 4 INJECTION INTRA-ARTICULAR; INTRALESIONAL; INTRAMUSCULAR; INTRAVENOUS; SOFT TISSUE at 07:39

## 2025-02-24 ASSESSMENT — PAIN DESCRIPTION - DESCRIPTORS
DESCRIPTORS: DULL
DESCRIPTORS: BURNING;SORE
DESCRIPTORS: ACHING;TIGHTNESS

## 2025-02-24 ASSESSMENT — PAIN DESCRIPTION - ORIENTATION
ORIENTATION: RIGHT
ORIENTATION: POSTERIOR

## 2025-02-24 ASSESSMENT — PAIN SCALES - GENERAL
PAINLEVEL_OUTOF10: 0
PAINLEVEL_OUTOF10: 4
PAINLEVEL_OUTOF10: 5
PAINLEVEL_OUTOF10: 7
PAINLEVEL_OUTOF10: 0

## 2025-02-24 ASSESSMENT — PAIN - FUNCTIONAL ASSESSMENT
PAIN_FUNCTIONAL_ASSESSMENT: 0-10
PAIN_FUNCTIONAL_ASSESSMENT: PREVENTS OR INTERFERES SOME ACTIVE ACTIVITIES AND ADLS
PAIN_FUNCTIONAL_ASSESSMENT: 0-10

## 2025-02-24 ASSESSMENT — PAIN DESCRIPTION - LOCATION
LOCATION: KNEE;LEG
LOCATION: LEG

## 2025-02-24 NOTE — ANESTHESIA PRE PROCEDURE
Department of Anesthesiology  Preprocedure Note       Name:  Mildred Francisco   Age:  65 y.o.  :  1960                                          MRN:  [id number]         Date:  2025      Surgeon: Surgeon(s):  Mirtha Pantoja MD    Procedure: Procedure(s):  RIGHT GREAT SAPHENOUS VEIN ABLATION AND PHLEBECTOMIES    Medications prior to admission:   Prior to Admission medications    Medication Sig Start Date End Date Taking? Authorizing Provider   acetaminophen (TYLENOL) 500 MG tablet Take 1 tablet by mouth 4 times daily as needed for Pain 11/15/24  Yes Tereza Bland MD   fluticasone (FLONASE) 50 MCG/ACT nasal spray 2 sprays by Each Nostril route daily 10/31/24  Yes Pavel Thompson DO   Dextromethorphan-guaiFENesin (MUCINEX DM)  MG TB12 Take 1 tablet by mouth 2 times daily 10/31/24  Yes Pavel Thompson DO   ibuprofen (ADVIL;MOTRIN) 200 MG tablet Take 2 tablets by mouth every 6 hours as needed for Pain   Yes Rabia Ramos MD   amLODIPine (NORVASC) 5 MG tablet TAKE 1 TABLET BY MOUTH DAILY 10/23/24  Yes Pavel Thompson DO   metoprolol succinate (TOPROL XL) 100 MG extended release tablet TAKE 1 TABLET BY MOUTH DAILY 24  Yes Pavel Thompson DO   Multiple Vitamin (MULTIVITAMIN PO) Take by mouth daily  Patient not taking: Reported on 2025    Rabia Ramos MD   aspirin EC 81 MG EC tablet Take 1 tablet by mouth daily 11   Jennings, Eunice TREVIÑO MD       Current medications:    Current Facility-Administered Medications   Medication Dose Route Frequency Provider Last Rate Last Admin   • lactated ringers infusion   IntraVENous Continuous Steve Bourgeois MD 50 mL/hr at 25 0636 New Bag at 25 0636   • sodium chloride flush 0.9 % injection 5-40 mL  5-40 mL IntraVENous 2 times per day Mirtha Pantoja MD       • sodium chloride flush 0.9 % injection 5-40 mL  5-40 mL IntraVENous PRN Mirtha Pantoja MD       • 0.9 % sodium chloride infusion   IntraVENous PRN Mirtha Pantoja MD

## 2025-02-24 NOTE — ANESTHESIA POSTPROCEDURE EVALUATION
Department of Anesthesiology  Postprocedure Note    Patient: Mildred Francisco  MRN: [id number]  YOB: 1960  Date of evaluation: 2/24/2025    Procedure Summary       Date: 02/24/25 Room / Location: [place] [address] / [facility]    Anesthesia Start: 0734 Anesthesia Stop: 0916    Procedure: RIGHT GREAT SAPHENOUS VEIN ABLATION AND PHLEBECTOMIES (Right: Leg Lower) Diagnosis:       Varicose veins of right lower extremity with inflammation      (Varicose veins of right lower extremity with inflammation [I83.11])    Surgeons: Mirtha Pantoja MD Responsible Provider: Iron Welsh MD    Anesthesia Type: general ASA Status: 2            Anesthesia Type: No value filed.    Wiley Phase I: Wiley Score: 10    Wiley Phase II: Wiley Score: 10    Anesthesia Post Evaluation    Patient location during evaluation: PACU  Patient participation: complete - patient participated  Level of consciousness: awake  Pain score: 2  Airway patency: patent  Cardiovascular status: blood pressure returned to baseline  Respiratory status: acceptable  Hydration status: euvolemic  Pain management: adequate    No notable events documented.

## 2025-02-24 NOTE — INTERVAL H&P NOTE
Update History & Physical    The patient's History and Physical of February 17, 2025 was reviewed with the patient and I examined the patient. There was no change. The surgical site was confirmed by the patient and me.     Plan: The risks, benefits, expected outcome, and alternative to the recommended procedure have been discussed with the patient. Patient understands and wants to proceed with the procedure.     Electronically signed by Cristiano Staley DO on 2/24/2025 at 6:09 AM

## 2025-02-24 NOTE — DISCHARGE INSTRUCTIONS
Discharge Instructions:    Diet:   You may resume a regular diet.    Wound Care:   Skin glue was used to cover your incision(s). It will fall off on its own in about 10 days. You may shower, but do not scrub the incision sites directly or soak (tub, pool, etc.). Your leg was wrapped in three layers. You may take off these dressings either Wednesday night or Thursday morning. Continue to use your stockings after this as directed.     Activity:   You may gradually return to your baseline level of activity    Pain management:   Unless informed of any restrictions by your primary care physician, please use your preferred over-the-counter pain reliever as your primary pain medication. If you have pain that persists despite over-the-counter pain medications, you have been provided with a prescription for an opioid/narcotic pain reliever (Roxicodone).   No driving or operating machinery while taking opioid/narcotic medications.     Bowel Regimen:   Opioid/Narcotic pain relievers have a common side effect of constipation; it is encouraged you take stool softeners in the interim.     Return Precautions:   Call/ Return to ED for increased redness, worsening pain, drainage from wound, fevers, or any other concerns about your incision or post op course.    Follow up with Dr. Pantoja in 2 weeks. Please call [phone] to schedule your appointment.     [facility] AMBULATORY PROCEDURE DISCHARGE INSTRUCTIONS    There are potential side effects of anesthesia or sedation you may experience for the first 24 hours.  These side effects include:    Confusion or Memory loss, Dizziness, or Delayed Reaction Times   [x]A responsible person should be with you for the next 24 hours.  Do not operate any vehicles (automobiles, bicycles, motorcycles) or power tools or machinery for 24 hours.  Do not sign any legal documents or make any legal decisions for 24 hours. Do not drink alcohol for 24 hours or while taking narcotic pain

## 2025-02-24 NOTE — FLOWSHEET NOTE
Patient received form the OR to PACU #7 POST RIGHT GREAT SAPHENOUS VEIN ABLATION AND PHLEBECTOMIES - Right of Dr. Pantoja. Placed on PACU monitoring equipment. Report given per CRNA, OR RN and Dr. Staley. Per report, patient was stable during the procedure. On arrival, patient is arouseable, jesus and denies pain.

## 2025-02-24 NOTE — PROGRESS NOTES
Ambulatory Surgery/Procedure Discharge Note    Vitals:    02/24/25 1011   BP: 124/77   Pulse: 66   Resp: 16   Temp: 97.7 °F (36.5 °C)   SpO2: 100%     Pt meets discharge criteria per Wiley score.  In: 1190 [P.O.:240; I.V.:950]  Out: 0     Restroom use offered before discharge.  Yes    Pain assessment:  present - adequately treated  Pain Level: 4 Pt states tolerable    Incision began to bleed through the dressing when pt stood up. Dr. Pantoja notified. Dr. Chappell arrived at bedside. Dressing removed and replaced. Instructed to keep pt for one more hour and then have her walk to ensure incision does not bleed again.    Per Dr. Pantoja, dressing was loosened and rewrapped.    Pt and S.O./family states \"ready to go home\". Pt alert and oriented x4. IV removed. Denies N/V. Dressing to R leg, c/d/I. Pedal pulse strong, toes pink and warm to touch. Pt tolerating po intake. Discharge instructions given to pt and  with pt permission. Pt and  verbalized understanding of all instructions. Left with all belongings, 1 prescriptions, and discharge instructions.     Patient discharged to home/self care. Patient discharged via wheel chair by transporter to waiting family/S.O.       2/24/2025 10:30 AM

## 2025-02-24 NOTE — OP NOTE
Operative Note      Patient: Mildred Francisco  YOB: 1960  MRN: [id number]    Date of Procedure: 2/24/2025    Pre-Op Diagnosis Codes:      * Varicose veins of right lower extremity with inflammation [I83.11]    Post-Op Diagnosis: Same       Procedure(s):  ULTRASOUND GUIDED ACCESS OF RIGHT GREAT SAPHENOUS VEIN  RIGHT GREAT SAPHENOUS VEIN ABLATION   STAB PHLEBECTOMIES OF RIGHT LOWER EXTREMITY      Surgeon(s):  Mirtha Pantoja MD    Assistant:   Resident: Cristiano Staley DO    Anesthesia: General    Estimated Blood Loss (mL): Minimal    Complications: None    Specimens:   * No specimens in log *    Implants:  * No implants in log *      Drains: * No LDAs found *    Findings:  Infection Present At Time Of Surgery (PATOS) (choose all levels that have infection present):  No infection present  Other Findings: Right great saphenous vein ablation and phlebectomies performed.  vein identified; however, not significant for ablation.     Detailed Description of Procedure:   After obtaining informed consent, the patient was taken to the operating room and placed in the supine position, prepped and draped in the usual sterile fashion.  General anesthetic was administered and a surgical time out was performed per hospital protocol.      The right great saphenous vein was identified and accessed under ultrasound guidance and a micropuncture sheath was placed through which a Bentson wire was inserted.  The sheath was exchanged for a 7 Micronesian sheath through which a 0.025 wire was inserted and passed up through the saphenofemoral junction.  A 7 cm x 60 cm length radiofrequency catheter was then inserted and positioned 3 cm proximal to the saphenofemoral junction.  Tumescence was performed throughout the saphenous sheath with a saline solution containing sodium bicarbonate, lidocaine and epinephrine.  Following this, radiofrequency ablation was performed in 7 cm segments throughout the entirety of the thigh

## 2025-02-24 NOTE — PROGRESS NOTES
Dr Pantoja with pt. Oxycodone 5mg given po as ordered by kimber Horn also provided to pt. Resident to return to redress dressing RLE as pt states I feels very tight.

## 2025-02-24 NOTE — BRIEF OP NOTE
Brief Postoperative Note      Patient: Mildred Francisco  YOB: 1960  MRN: [id number]    Date of Procedure: 2/24/2025    Pre-Op Diagnosis Codes:      * Varicose veins of right lower extremity with inflammation [I83.11]    Post-Op Diagnosis: Same       Procedure(s):  RIGHT GREAT SAPHENOUS VEIN ABLATION AND PHLEBECTOMIES    Surgeon(s):  Mirtha Pantoja MD    Assistant:  Resident: Cristiano Staley DO    Anesthesia: General    Estimated Blood Loss (mL): Minimal    Complications: None    Specimens:   * No specimens in log *    Implants:  * No implants in log *      Drains: * No LDAs found *    Findings:  Infection Present At Time Of Surgery (PATOS) (choose all levels that have infection present):  No infection present  Other Findings: Case as posted. Full operative note to follow    Electronically signed by Cristiano Staley DO on 2/24/2025 at 9:13 AM

## 2025-02-24 NOTE — FLOWSHEET NOTE
PACU Transfer to [facility]    Vitals:    02/24/25 1000   BP: 124/76   Pulse: 71   Resp: 16   Temp: 97.9 °F (36.6 °C)   SpO2: 94%         Intake/Output Summary (Last 24 hours) at 2/24/2025 1008  Last data filed at 2/24/2025 1007  Gross per 24 hour   Intake 1190 ml   Output 0 ml   Net 1190 ml       Pain assessment:  present - adequately treated, declines pain pill at this time  Pain Level: 4    Patient transferred to care of [facility] RN. Transferred with all belongings to [facility] #5 per PACU transporterPauline.    2/24/2025 10:08 AM

## 2025-02-28 ENCOUNTER — TELEPHONE (OUTPATIENT)
Dept: VASCULAR SURGERY | Age: 65
End: 2025-02-28

## 2025-02-28 PROBLEM — G89.18 ACUTE POST-OPERATIVE PAIN: Status: ACTIVE | Noted: 2025-02-28

## 2025-03-05 ENCOUNTER — E-VISIT (OUTPATIENT)
Dept: INTERNAL MEDICINE CLINIC | Age: 65
End: 2025-03-05

## 2025-03-05 DIAGNOSIS — R21 SKIN RASH: Primary | ICD-10-CM

## 2025-03-05 RX ORDER — TRIAMCINOLONE ACETONIDE 1 MG/G
CREAM TOPICAL
Qty: 45 G | Refills: 0 | Status: SHIPPED | OUTPATIENT
Start: 2025-03-05

## 2025-03-05 NOTE — PROGRESS NOTES
HPI: as per patient provided history  Exam: N/A (electronic visit)  ASSESSMENT/PLAN:  Diagnoses and all orders for this visit:    Skin rash  -     triamcinolone (KENALOG) 0.1 % cream; Apply topically 2 times daily to affected area for 10-14 days.    Etiology is unclear  Lesions look suspiciously like insect bites.  Advised patient to check for bedbugs although these do not look like bedbug bites.  Look for other infestations.    Patient instructed to call the office if worsens, or fails to improve as anticipated.    5-10 (10 minutes) minutes were spent on the digital evaluation and management of this patient.    Pavel Thompson, DO

## 2025-03-11 ENCOUNTER — OFFICE VISIT (OUTPATIENT)
Dept: VASCULAR SURGERY | Age: 65
End: 2025-03-11

## 2025-03-11 VITALS
HEIGHT: 66 IN | HEART RATE: 93 BPM | SYSTOLIC BLOOD PRESSURE: 132 MMHG | OXYGEN SATURATION: 97 % | DIASTOLIC BLOOD PRESSURE: 80 MMHG | WEIGHT: 230 LBS | BODY MASS INDEX: 36.96 KG/M2

## 2025-03-11 DIAGNOSIS — I87.2 VENOUS INSUFFICIENCY OF BOTH LOWER EXTREMITIES: Primary | ICD-10-CM

## 2025-03-11 PROCEDURE — 99024 POSTOP FOLLOW-UP VISIT: CPT | Performed by: SURGERY

## 2025-03-11 NOTE — PROGRESS NOTES
3/11/2025    Mildred Francisco (:  1960) is a 65 y.o. female,Established patient, here for evaluation of the following chief complaint(s):  Post-Op Check (2 week - 2025)        ASSESSMENT/PLAN:  1. Venous insufficiency of both lower extremities  She has done very well following varicose vein surgery of both legs.  Recommended continuation of compression on a regular basis, increase cardiovascular activity, weight management.  No restrictions.    I will be happy to see her at any time on an as needed basis.     No follow-ups on file.       SUBJECTIVE/OBJECTIVE:  POV#1 Right GSV RF ablation,  ablation and multiple stab phlebectomies (25)  Reports that legs feel good.  Wearing her compression--works from home.     Physical Exam  Vitals:    25 0910   BP: 132/80   BP Site: Right Calf   Patient Position: Sitting   BP Cuff Size: Large Adult   Pulse: 93   SpO2: 97%   Weight: 104.3 kg (230 lb)   Height: 1.676 m (5' 5.98\")     Exam:  BLE warm and well-perfused.  Minimal edema--no pitting.   Incisions all healing nicely.  One small firm, tender area over tract of GSV in proximal, medial calf.  No erythema or drainage.  Findings within expected range after phlebectomy.        An electronic signature was used to authenticate this note.    --Mirtha Pantoja MD

## 2025-05-01 ENCOUNTER — E-VISIT (OUTPATIENT)
Dept: INTERNAL MEDICINE CLINIC | Age: 65
End: 2025-05-01
Payer: COMMERCIAL

## 2025-05-01 DIAGNOSIS — J06.9 URI WITH COUGH AND CONGESTION: Primary | ICD-10-CM

## 2025-05-01 PROCEDURE — 99421 OL DIG E/M SVC 5-10 MIN: CPT | Performed by: INTERNAL MEDICINE

## 2025-05-01 RX ORDER — GUAIFENESIN AND DEXTROMETHORPHAN HYDROBROMIDE 600; 30 MG/1; MG/1
1 TABLET, EXTENDED RELEASE ORAL 2 TIMES DAILY
COMMUNITY
Start: 2025-05-01 | End: 2025-05-11

## 2025-05-01 RX ORDER — DOXYCYCLINE HYCLATE 100 MG
100 TABLET ORAL 2 TIMES DAILY
Qty: 20 TABLET | Refills: 0 | Status: SHIPPED | OUTPATIENT
Start: 2025-05-01 | End: 2025-05-11

## 2025-05-01 ASSESSMENT — LIFESTYLE VARIABLES
PACKS_PER_DAY: 1
SMOKING_YEARS: 30
SMOKING_STATUS: NO, I'M A FORMER SMOKER

## 2025-05-01 NOTE — PROGRESS NOTES
HPI: as per patient provided history  Exam: N/A (electronic visit)  ASSESSMENT/PLAN:  Diagnoses and all orders for this visit:    URI with cough and congestion  -     doxycycline hyclate (VIBRA-TABS) 100 MG tablet; Take 1 tablet by mouth 2 times daily for 10 days  -     dextromethorphan-guaiFENesin (MUCINEX DM)  MG per extended release tablet; Take 1 tablet by mouth 2 times daily for 10 days        Patient instructed to call the office if worsens, or fails to improve as anticipated.    5-10 (7) minutes were spent on the digital evaluation and management of this patient.    Pavel Thompson, DO

## 2025-05-21 DIAGNOSIS — I10 BENIGN ESSENTIAL HTN: ICD-10-CM

## 2025-05-22 RX ORDER — METOPROLOL SUCCINATE 100 MG/1
100 TABLET, EXTENDED RELEASE ORAL DAILY
Qty: 90 TABLET | Refills: 3 | Status: SHIPPED | OUTPATIENT
Start: 2025-05-22

## 2025-05-22 NOTE — TELEPHONE ENCOUNTER
Last appointment: 5/1/2025 PRE OP.   Last few apts have either been acute or pre ops.     Next appointment: Visit date not found    [facility] due[facility]over[facility].     Last refill: 5/28/2024) by Pavel Thompson DO

## 2025-06-16 ENCOUNTER — PATIENT MESSAGE (OUTPATIENT)
Dept: INTERNAL MEDICINE CLINIC | Age: 65
End: 2025-06-16

## 2025-06-16 NOTE — TELEPHONE ENCOUNTER
M for the patient to callback and get her daughter Anastasia Francisco ( previous Dr. Antoine patient) scheduled with Dr. Thompson. Can get her in either on 7/1 @ 4:00 pm or 7/2 @ 4:00 pm.

## (undated) DEVICE — LARGE BORE STOPCOCK WITH ROTATING MALE LUER LOCK

## (undated) DEVICE — MARYLAND BIPOLAR FORCEPS: Brand: ENDOWRIST

## (undated) DEVICE — SOLUTION IRRIG 1000ML 0.9% SOD CHL USP POUR PLAS BTL

## (undated) DEVICE — PACK,UNIVERSAL,NO GOWNS: Brand: MEDLINE

## (undated) DEVICE — COLUMN DRAPE

## (undated) DEVICE — DRAPE,LAP,CHOLE,W/TROUGHS,STERILE: Brand: MEDLINE

## (undated) DEVICE — STAPLER SKIN H3.9MM WIRE DIA0.58MM CRWN 6.9MM 35 STPL ROT

## (undated) DEVICE — SYRINGE MED 50ML LUERLOCK TIP

## (undated) DEVICE — CATHETER ABLAT 7FR L60CM HEAT ELEMENT L7CM 0.025IN

## (undated) DEVICE — LIQUIBAND RAPID ADHESIVE 36/CS 0.8ML: Brand: MEDLINE

## (undated) DEVICE — CONTAINER,SPECIMEN,PNEU TUBE,3OZ,OR STRL: Brand: MEDLINE

## (undated) DEVICE — Device

## (undated) DEVICE — STANDARD HYPODERMIC NEEDLE,POLYPROPYLENE HUB: Brand: MONOJECT

## (undated) DEVICE — TROCAR: Brand: KII FIOS FIRST ENTRY

## (undated) DEVICE — SUTURE MCRYL SZ 4-0 L27IN ABSRB UD L19MM PS-2 1/2 CIR PRIM Y426H

## (undated) DEVICE — ELECTRO LUBE IS A SINGLE PATIENT USE DEVICE THAT IS INTENDED TO BE USED ON ELECTROSURGICAL ELECTRODES TO REDUCE STICKING.: Brand: KEY SURGICAL ELECTRO LUBE

## (undated) DEVICE — GLOVE SURG SZ 6 L11.2IN FNGR THK9.8MIL STRW LTX POLYMER

## (undated) DEVICE — PROVE COVER: Brand: UNBRANDED

## (undated) DEVICE — GOWN,SIRUS,POLYRNF,BRTHSLV,LG,30/CS: Brand: MEDLINE

## (undated) DEVICE — GENERAL: Brand: MEDLINE INDUSTRIES, INC.

## (undated) DEVICE — BNDG,ELSTC,MATRIX,STRL,4"X5YD,LF,HOOK&LP: Brand: MEDLINE

## (undated) DEVICE — TOWEL,OR,DSP,ST,WHITE,DLX,4/PK,20PK/CS: Brand: MEDLINE

## (undated) DEVICE — SOLUTION SURG PREP 26 CC PURPREP

## (undated) DEVICE — SET INTRO SHTH MIC L7CM DIA7FR 0.018IN NDL L2.75IN DIA21GA

## (undated) DEVICE — BLADE CLP TAPR HD WET DRY CAPABILITY GTT IN CHARGING USE

## (undated) DEVICE — BLANKET WRM W29.9XL79.1IN UP BODY FORC AIR MISTRAL-AIR

## (undated) DEVICE — SCISSORS ENDOSCP DIA5MM CRV MPLR CAUT W/ RATCH HNDL

## (undated) DEVICE — ARM DRAPE

## (undated) DEVICE — INTENDED USE FOR SURGICAL MARKING ON INTACT SKIN, ALSO PROVIDES A PERMANENT METHOD OF IDENTIFYING OBJECTS IN THE OPERATING ROOM: Brand: WRITESITE® PLUS MINI PREP RESISTANT MARKER

## (undated) DEVICE — SYRINGE MED 20ML STD CLR PLAS LUERLOCK TIP N CTRL DISP

## (undated) DEVICE — SET ADMIN 25ML L117IN PMP MOD CK VLV RLER CLMP 2 SMRTSITE

## (undated) DEVICE — PLATE ES AD W 9FT CRD 2

## (undated) DEVICE — SPONGE,LAP,18"X18",DLX,XR,ST,5/PK,40/PK: Brand: MEDLINE

## (undated) DEVICE — BANDAGE COMPR ELASTIC 4 IN STRL ACE

## (undated) DEVICE — BLADE, TONGUE, 6", STERILE: Brand: MEDLINE

## (undated) DEVICE — CHLORAPREP 26ML ORANGE

## (undated) DEVICE — [HIGH FLOW INSUFFLATOR,  DO NOT USE IF PACKAGE IS DAMAGED,  KEEP DRY,  KEEP AWAY FROM SUNLIGHT,  PROTECT FROM HEAT AND RADIOACTIVE SOURCES.]: Brand: PNEUMOSURE

## (undated) DEVICE — SYRINGE, LUER LOCK, 10ML: Brand: MEDLINE

## (undated) DEVICE — BANDAGE ELASTIC 6 IN ELASTIC STRL ACE

## (undated) DEVICE — BLADE,CARBON-STEEL,11,STRL,DISPOSABLE,TB: Brand: MEDLINE

## (undated) DEVICE — TURNOVER KIT RM INF CTRL TECH

## (undated) DEVICE — BNDG,ELSTC,MATRIX,STRL,6"X5YD,LF,HOOK&LP: Brand: MEDLINE

## (undated) DEVICE — FOAM BUMP, LARGE: Brand: MEDLINE INDUSTRIES, INC.

## (undated) DEVICE — SCISSORS SURG DIA8MM MPLR CRV ENDOWRIST

## (undated) DEVICE — DRESSING PETRO W05INXL4YD N ADH STRP CURITY

## (undated) DEVICE — GEL US 20GM NONIRRITATING OVERWRAPPED FILE PCH TRNSMIT

## (undated) DEVICE — LARGE HEM-O-LOK CLIP APPLIER: Brand: ENDOWRIST

## (undated) DEVICE — SYRINGE MED 10ML LUERLOCK TIP W/O SFTY DISP

## (undated) DEVICE — GLOVE SURG SZ 7 L12IN FNGR THK87MIL DK GRN LTX POLYMER W

## (undated) DEVICE — FIXED CORE WIRE GUIDE STRAIGHT: Brand: COOK

## (undated) DEVICE — STRIP,CLOSURE,WOUND,MEDI-STRIP,1/2X4: Brand: MEDLINE

## (undated) DEVICE — SET EXTN PRIMING 4.9ML L30IN INCL SLDE CLMP SPIN M LUERLOCK

## (undated) DEVICE — Z INACTIVE USE 2635508 SOLUTION IRRIG 500ML 0.9% SOD CHL USP POUR PLAS BTL

## (undated) DEVICE — SURGICAL SET UP - SURE SET: Brand: MEDLINE INDUSTRIES, INC.

## (undated) DEVICE — PADDING CAST W4INXL4YD HIGHLY ABSRB THAN COT EZ APPL

## (undated) DEVICE — SOLUTION IRRIG 1000ML 09% SOD CHL USP PIC PLAS CONTAINER

## (undated) DEVICE — TOWEL,OR,DSP,ST,BLUE,DLX,8/PK,10PK/CS: Brand: MEDLINE

## (undated) DEVICE — CATHETER IV 14GA L5.25IN PERIPH ORNG FEP POLYMER 3 BVL

## (undated) DEVICE — NEEDLE SPNL 20GA L3.5IN YEL HUB S STL REG WALL FIT STYL

## (undated) DEVICE — BLADELESS OBTURATOR: Brand: WECK VISTA

## (undated) DEVICE — GARMENT,MEDLINE,DVT,INT,CALF,MED, GEN2: Brand: MEDLINE

## (undated) DEVICE — CANNULA SEAL

## (undated) DEVICE — BANDAGE COBAN 4 IN COMPR W4INXL5YD FOAM COHESIVE QUIK STK SELF ADH SFT

## (undated) DEVICE — TISSUE RETRIEVAL SYSTEM: Brand: INZII RETRIEVAL SYSTEM

## (undated) DEVICE — TIP-UP FENESTRATED GRASPER: Brand: ENDOWRIST

## (undated) DEVICE — AIR SHEET,LAT,COMFORT GLIDE, BLEND 40X80: Brand: MEDLINE

## (undated) DEVICE — SOLUTION IV 1000ML 0.9% SOD CHL

## (undated) DEVICE — GLOVE SURG SZ 65 L12IN FNGR THK79MIL GRN LTX FREE

## (undated) DEVICE — 6 ML SYRINGE LUER-LOCK TIP: Brand: MONOJECT

## (undated) DEVICE — Z DUP USE 2641840 CLIP INT L POLYMER LOK LIG HEM O LOK

## (undated) DEVICE — RADIFOCUS GLIDECATH: Brand: GLIDECATH

## (undated) DEVICE — SKIN AFFIX SURG ADHESIVE 72/CS 0.55ML: Brand: MEDLINE

## (undated) DEVICE — GLOVE SURG SZ 7 L12IN FNGR THK75MIL WHT LTX POLYMER BEAD

## (undated) DEVICE — SURE SET-DOUBLE BASIN-LF: Brand: MEDLINE INDUSTRIES, INC.

## (undated) DEVICE — STYLET ABLAT 6FR L12CM 0.035IN ENDOVENOUS RF COMPATIBLE W/

## (undated) DEVICE — SOLUTION ANTIFOG VIS SYS CLEARIFY LAPSCP

## (undated) DEVICE — SYRINGE MED 10ML POLYPR LUERSLIP TIP FLAT TOP W/O SFTY DISP